# Patient Record
Sex: FEMALE | Race: WHITE
[De-identification: names, ages, dates, MRNs, and addresses within clinical notes are randomized per-mention and may not be internally consistent; named-entity substitution may affect disease eponyms.]

---

## 2018-01-07 ENCOUNTER — HEALTH MAINTENANCE LETTER (OUTPATIENT)
Age: 33
End: 2018-01-07

## 2018-01-08 ENCOUNTER — OFFICE VISIT (OUTPATIENT)
Dept: PEDIATRICS | Facility: CLINIC | Age: 33
End: 2018-01-08
Payer: COMMERCIAL

## 2018-01-08 VITALS
BODY MASS INDEX: 39.03 KG/M2 | TEMPERATURE: 98 F | OXYGEN SATURATION: 99 % | HEART RATE: 94 BPM | HEIGHT: 67 IN | WEIGHT: 248.7 LBS | DIASTOLIC BLOOD PRESSURE: 94 MMHG | SYSTOLIC BLOOD PRESSURE: 142 MMHG

## 2018-01-08 DIAGNOSIS — G47.33 OSA (OBSTRUCTIVE SLEEP APNEA): ICD-10-CM

## 2018-01-08 DIAGNOSIS — I10 BENIGN ESSENTIAL HYPERTENSION: ICD-10-CM

## 2018-01-08 DIAGNOSIS — Z00.00 HEALTH CARE MAINTENANCE: Primary | ICD-10-CM

## 2018-01-08 DIAGNOSIS — Z30.9 ENCOUNTER FOR CONTRACEPTIVE MANAGEMENT, UNSPECIFIED TYPE: ICD-10-CM

## 2018-01-08 DIAGNOSIS — J35.1 ENLARGED TONSILS: ICD-10-CM

## 2018-01-08 PROCEDURE — G0145 SCR C/V CYTO,THINLAYER,RESCR: HCPCS | Performed by: NURSE PRACTITIONER

## 2018-01-08 PROCEDURE — 99214 OFFICE O/P EST MOD 30 MIN: CPT | Mod: 25 | Performed by: NURSE PRACTITIONER

## 2018-01-08 PROCEDURE — 36415 COLL VENOUS BLD VENIPUNCTURE: CPT | Performed by: NURSE PRACTITIONER

## 2018-01-08 PROCEDURE — 80048 BASIC METABOLIC PNL TOTAL CA: CPT | Performed by: NURSE PRACTITIONER

## 2018-01-08 PROCEDURE — 99385 PREV VISIT NEW AGE 18-39: CPT | Performed by: NURSE PRACTITIONER

## 2018-01-08 PROCEDURE — 87624 HPV HI-RISK TYP POOLED RSLT: CPT | Performed by: NURSE PRACTITIONER

## 2018-01-08 PROCEDURE — 82043 UR ALBUMIN QUANTITATIVE: CPT | Performed by: NURSE PRACTITIONER

## 2018-01-08 RX ORDER — HYDROCHLOROTHIAZIDE 25 MG/1
25 TABLET ORAL DAILY
Qty: 30 TABLET | Refills: 1 | Status: SHIPPED | OUTPATIENT
Start: 2018-01-08 | End: 2018-03-08

## 2018-01-08 RX ORDER — NORGESTIMATE AND ETHINYL ESTRADIOL 0.25-0.035
1 KIT ORAL DAILY
Qty: 84 TABLET | Refills: 3 | Status: SHIPPED | OUTPATIENT
Start: 2018-01-08 | End: 2018-12-14

## 2018-01-08 NOTE — MR AVS SNAPSHOT
After Visit Summary   1/8/2018    Radha Rodriguez    MRN: [id number]           Patient Information     Date Of Birth          1985        Visit Information        Provider Department      1/8/2018 4:00 PM Alivia Hercules APRN [facility] [place]        Today's Diagnoses     Health care maintenance    -  1    Encounter for contraceptive management, unspecified type        Enlarged tonsils        THEODORE (obstructive sleep apnea)        Benign essential hypertension          Care Instructions    1. See ENT  2. Labs  3. Labs today. Start BP meds. Re-check labs in 2 weeks.      Preventive Health Recommendations  Female Ages 26 - 39  Yearly exam:   See your health care provider every year in order to    Review health changes.     Discuss preventive care.      Review your medicines if you your doctor has prescribed any.    Until age 30: Get a Pap test every three years (more often if you have had an abnormal result).    After age 30: Talk to your doctor about whether you should have a Pap test every 3 years or have a Pap test with HPV screening every 5 years.   You do not need a Pap test if your uterus was removed (hysterectomy) and you have not had cancer.  You should be tested each year for STDs (sexually transmitted diseases), if you're at risk.   Talk to your provider about how often to have your cholesterol checked.  If you are at risk for diabetes, you should have a diabetes test (fasting glucose).  Shots: Get a flu shot each year. Get a tetanus shot every 10 years.   Nutrition:     Eat at least 5 servings of fruits and vegetables each day.    Eat whole-grain bread, whole-wheat pasta and brown rice instead of white grains and rice.    Talk to your provider about Calcium and Vitamin D.     Lifestyle    Exercise at least 150 minutes a week (30 minutes a day, 5 days of the week). This will help you control your weight and prevent disease.    Limit alcohol to one drink per day.    No smoking.      Wear sunscreen to prevent skin cancer.    See your dentist every six months for an exam and cleaning.            Follow-ups after your visit        Additional Services     OTOLARYNGOLOGY REFERRAL       Your provider has referred you to: [facility]: [facility] [place] [phone]   [email or web address]    Please be aware that coverage of these services is subject to the terms and limitations of your health insurance plan.  Call member services at your health plan with any benefit or coverage questions.      Please bring the following with you to your appointment:    (1) Any X-Rays, CTs or MRIs which have been performed.  Contact the facility where they were done to arrange for  prior to your scheduled appointment.   (2) List of current medications  (3) This referral request   (4) Any documents/labs given to you for this referral                  Future tests that were ordered for you today     Open Future Orders        Priority Expected Expires Ordered    Basic metabolic panel Routine  7/11/2018 1/8/2018    Lipid panel reflex to direct LDL Fasting Routine  1/8/2019 1/8/2018    Glucose Routine  1/8/2019 1/8/2018    TSH with free T4 reflex Routine  1/8/2019 1/8/2018            Who to contact     If you have questions or need follow up information about today's clinic visit or your schedule please contact [facility] directly at [phone].  Normal or non-critical lab and imaging results will be communicated to you by MyChart, letter or phone within 4 business days after the clinic has received the results. If you do not hear from us within 7 days, please contact the clinic through MyChart or phone. If you have a critical or abnormal lab result, we will notify you by phone as soon as possible.  Submit refill requests through [facility] or call your pharmacy and they will forward the refill request to us. Please allow 3 business days for your refill to be completed.  "         Additional Information About Your Visit        MyChart Information     [employer] lets you send messages to your doctor, view your test results, renew your prescriptions, schedule appointments and more. To sign up, go to [email or web address] . Click on \"Log in\" on the left side of the screen, which will take you to the Welcome page. Then click on \"Sign up Now\" on the right side of the page.     You will be asked to enter the access code listed below, as well as some personal information. Please follow the directions to create your username and password.     Your access code is: [email or web address]  Expires: 2018  5:48 PM     Your access code will  in 90 days. If you need help or a new code, please call your [facility] clinic or [phone].        Care EveryWhere ID     This is your Care EveryWhere ID. This could be used by other organizations to access your [facility] medical records  [id number]        Your Vitals Were     Pulse Temperature Height Pulse Oximetry BMI (Body Mass Index)       94 98  F (36.7  C) (Tympanic) 5' 7\" (1.702 m) 99% 38.95 kg/m2        Blood Pressure from Last 3 Encounters:   18 (!) 142/94    Weight from Last 3 Encounters:   18 248 lb 11.2 oz (112.8 kg)              We Performed the Following     Albumin Random Urine Quantitative with Creat Ratio     Basic metabolic panel     HPV High Risk Types DNA Cervical     OTOLARYNGOLOGY REFERRAL     Pap imaged thin layer screen with HPV - recommended age 30 - 65          Today's Medication Changes          These changes are accurate as of: 18  5:48 PM.  If you have any questions, ask your nurse or doctor.               Start taking these medicines.        Dose/Directions    hydrochlorothiazide 25 MG tablet   Commonly known as:  HYDRODIURIL   Used for:  Benign essential hypertension   Started by:  Alivia Hercules APRN CNP        Dose:  25 mg   Take 1 tablet (25 mg) by mouth daily   Quantity:  30 tablet   Refills:  1    "    norgestimate-ethinyl estradiol 0.25-35 MG-MCG per tablet   Commonly known as:  ORTHO-CYCLEN, SPRINTEC   Used for:  Encounter for contraceptive management, unspecified type   Started by:  Alivia Hercules APRN CNP        Dose:  1 tablet   Take 1 tablet by mouth daily   Quantity:  84 tablet   Refills:  3            Where to get your medicines      These medications were sent to [place] [address] [facility] - [place], [place] - [address]  [address], [facility] [address]     Phone:  [phone]     hydrochlorothiazide 25 MG tablet    norgestimate-ethinyl estradiol 0.25-35 MG-MCG per tablet                Primary Care Provider Office Phone # Fax #    MONA Wei [phone] [address] [place] [address]        Equal Access to Services     [facility]: Hadii aad ku hadasho Soomaali, waaxda luqadaha, qaybta kaalmada adeegyada, sylvain contreras . So [facility] [phone].    ATENCIÓN: Si habla español, tiene a george disposición servicios gratuitos de asistencia lingüística. [facility] [phone].    We comply with applicable federal civil rights laws and [place] laws. We do not discriminate on the basis of race, color, national origin, age, disability, sex, sexual orientation, or gender identity.            Thank you!     Thank you for choosing [facility]  for your care. Our goal is always to provide you with excellent care. Hearing back from our patients is one way we can continue to improve our services. Please take a few minutes to complete the written survey that you may receive in the mail after your visit with us. Thank you!             Your Updated Medication List - Protect others around you: Learn how to safely use, store and throw away your medicines at www.disposemymeds.org.          This list is accurate as of: 1/8/18  5:48 PM.  Always use your most recent med list.                   Brand Name Dispense  Instructions for use Diagnosis    hydrochlorothiazide 25 MG tablet    HYDRODIURIL    30 tablet    Take 1 tablet (25 mg) by mouth daily    Benign essential hypertension       MULTIVITAMIN PO           norgestimate-ethinyl estradiol 0.25-35 MG-MCG per tablet    ORTHO-CYCLEN, SPRINTEC    84 tablet    Take 1 tablet by mouth daily    Encounter for contraceptive management, unspecified type

## 2018-01-08 NOTE — PATIENT INSTRUCTIONS
1. See ENT  2. Labs  3. Labs today. Start BP meds. Re-check labs in 2 weeks.      Preventive Health Recommendations  Female Ages 26 - 39  Yearly exam:   See your health care provider every year in order to    Review health changes.     Discuss preventive care.      Review your medicines if you your doctor has prescribed any.    Until age 30: Get a Pap test every three years (more often if you have had an abnormal result).    After age 30: Talk to your doctor about whether you should have a Pap test every 3 years or have a Pap test with HPV screening every 5 years.   You do not need a Pap test if your uterus was removed (hysterectomy) and you have not had cancer.  You should be tested each year for STDs (sexually transmitted diseases), if you're at risk.   Talk to your provider about how often to have your cholesterol checked.  If you are at risk for diabetes, you should have a diabetes test (fasting glucose).  Shots: Get a flu shot each year. Get a tetanus shot every 10 years.   Nutrition:     Eat at least 5 servings of fruits and vegetables each day.    Eat whole-grain bread, whole-wheat pasta and brown rice instead of white grains and rice.    Talk to your provider about Calcium and Vitamin D.     Lifestyle    Exercise at least 150 minutes a week (30 minutes a day, 5 days of the week). This will help you control your weight and prevent disease.    Limit alcohol to one drink per day.    No smoking.     Wear sunscreen to prevent skin cancer.    See your dentist every six months for an exam and cleaning.

## 2018-01-08 NOTE — PROGRESS NOTES
SUBJECTIVE:   CC: Radha Rodriguez is an 32 year old woman who presents for preventive health visit.     Physical   Annual:     Getting at least 3 servings of Calcium per day::  Yes    Bi-annual eye exam::  Yes    Dental care twice a year::  Yes    Sleep apnea or symptoms of sleep apnea::  Sleep apnea    Diet::  Regular (no restrictions)    Frequency of exercise::  2-3 days/week    Duration of exercise::  30-45 minutes    Taking medications regularly::  Yes    Medication side effects::  Not applicable    Additional concerns today::  YES          Concerns today: contraception for PCOS symptoms. Was diagnosed with PCOS at age 19. Gets very irregular periods. Lately has been constant spotting.    Previous history of high BP - not currently on meds. Back when she was on meds BP was in the 180s. Has changed her diet since then.    -------------------------------------    Today's PHQ-2 Score:   PHQ-2 ( 1999 Pfizer) 1/8/2018   Q1: Little interest or pleasure in doing things 0   Q2: Feeling down, depressed or hopeless 0   PHQ-2 Score 0   Q1: Little interest or pleasure in doing things Not at all   Q2: Feeling down, depressed or hopeless Not at all   PHQ-2 Score 0     Abuse: Current or Past(Physical, Sexual or Emotional)- No  Do you feel safe in your environment - Yes    Social History   Substance Use Topics     Smoking status: Never Smoker     Smokeless tobacco: Never Used     Alcohol use Yes      Comment: 1 per month     Alcohol Use 1/8/2018   If you drink alcohol, do you typically have greater than 3 drinks per day OR greater than 7 drinks per week?   No   No flowsheet data found.      Reviewed orders with patient.  Reviewed health maintenance and updated orders accordingly - Yes  Labs reviewed in EPIC    Mammogram not appropriate for this patient based on age.    Pertinent mammograms are reviewed under the imaging tab.  History of abnormal Pap smear: NO - age 30-65 PAP every 5 years with negative HPV co-testing  "recommended    Reviewed and updated as needed this visit by clinical staffTobacco  Allergies  Meds  Med Hx  Surg Hx  Fam Hx  Soc Hx        Reviewed and updated as needed this visit by Provider            Review of Systems  C: NEGATIVE for fever, chills, change in weight  I: NEGATIVE for worrisome rashes, moles or lesions  E: NEGATIVE for vision changes or irritation  ENT: NEGATIVE for ear, mouth and throat problems  R: NEGATIVE for significant cough or SOB  B: NEGATIVE for masses, tenderness or discharge  CV: NEGATIVE for chest pain, palpitations or peripheral edema  GI: NEGATIVE for nausea, abdominal pain, heartburn, or change in bowel habits  : NEGATIVE for unusual urinary or vaginal symptoms. Periods are regular.  M: NEGATIVE for significant arthralgias or myalgia  N: NEGATIVE for weakness, dizziness or paresthesias  P: NEGATIVE for changes in mood or affect  DERM: Hirsutism.      OBJECTIVE:   BP (!) 142/94 (BP Location: Right arm, Patient Position: Sitting, Cuff Size: Adult Large)  Pulse 94  Temp 98  F (36.7  C) (Tympanic)  Ht 5' 7\" (1.702 m)  Wt 248 lb 11.2 oz (112.8 kg)  LMP   SpO2 99%  BMI 38.95 kg/m2  Physical Exam  GENERAL: healthy, alert and no distress  EYES: Eyes grossly normal to inspection, PERRL and conjunctivae and sclerae normal  HENT: ear canals and TM's normal, nose and mouth without ulcers or lesions  NECK: no adenopathy, no asymmetry, masses, or scars and thyroid normal to palpation  RESP: lungs clear to auscultation - no rales, rhonchi or wheezes  BREAST: normal without masses, tenderness or nipple discharge and no palpable axillary masses or adenopathy  CV: regular rate and rhythm, normal S1 S2, no S3 or S4, no murmur, click or rub, no peripheral edema and peripheral pulses strong  ABDOMEN: soft, nontender, no hepatosplenomegaly, no masses and bowel sounds normal   (female): normal female external genitalia, normal urethral meatus, vaginal mucosa pink, moist, well rugated, and " "normal cervix/adnexa/uterus without masses or discharge  MS: no gross musculoskeletal defects noted, no edema  SKIN: no suspicious lesions or rashes  NEURO: Normal strength and tone, mentation intact and speech normal  PSYCH: mentation appears normal, affect normal/bright    ASSESSMENT/PLAN:   1. Health care maintenance  - Pap imaged thin layer screen with HPV - recommended age 30 - 65  - HPV High Risk Types DNA Cervical  - Lipid panel reflex to direct LDL Fasting; Future  - Glucose; Future  - TSH with free T4 reflex; Future    2. Encounter for contraceptive management, unspecified type  Patient with PCOS. Previously on OCPs. No migraine with aura or history of blood clots. She does have a history of hypertension and is starting medication today. Will have her monitor her BP and wait a month before starting medications.   - norgestimate-ethinyl estradiol (ORTHO-CYCLEN, SPRINTEC) 0.25-35 MG-MCG per tablet; Take 1 tablet by mouth daily  Dispense: 84 tablet; Refill: 3    3. Enlarged tonsils  History of enlarged tonsils. Has sleep apnea.   - OTOLARYNGOLOGY REFERRAL    4. THEODORE (obstructive sleep apnea)  Wants to see ENT to see if removing her tonsils can improve sleep apnea sx.   - OTOLARYNGOLOGY REFERRAL    5. Benign essential hypertension  Ongoing, previously much higher. Hasn't been on meds for quite some time and is asymptomatic.   -Labs today. Re-start HCTZ then BP check/labs 2 weeks.   - hydrochlorothiazide (HYDRODIURIL) 25 MG tablet; Take 1 tablet (25 mg) by mouth daily  Dispense: 30 tablet; Refill: 1  - Albumin Random Urine Quantitative with Creat Ratio  - Basic metabolic panel; Future  - Basic metabolic panel    COUNSELING:  Reviewed preventive health counseling, as reflected in patient instructions         reports that she has never smoked. She has never used smokeless tobacco.    Estimated body mass index is 38.95 kg/(m^2) as calculated from the following:    Height as of this encounter: 5' 7\" (1.702 m).    " Weight as of this encounter: 248 lb 11.2 oz (112.8 kg).   Weight management plan: Discussed healthy diet and exercise guidelines and patient will follow up in 12 months in clinic to re-evaluate.    Counseling Resources:  ATP IV Guidelines  Pooled Cohorts Equation Calculator  Breast Cancer Risk Calculator  FRAX Risk Assessment  ICSI Preventive Guidelines  Dietary Guidelines for Americans, 2010  USDA's MyPlate  ASA Prophylaxis  Lung CA Screening    MONA Wei [facility] [place]

## 2018-01-08 NOTE — LETTER
[address] [address]                  [phone]   January 10, 2018    Radha Rodriguez  [address]   [facility] [address]      Yahir Garcia,    Your blood tests look normal (kidneys, blood sugar). However, your urine shows some protein in the urine, which is an early sign of kidney damage, typically from high blood pressure. This is typically reversible if you control your blood pressure. We can re-check next year. But I will await your blood pressure recheck in 2 weeks. Keep up the good work with exercise, as this will help your blood pressure as well.    All the best,    Alivia Hercules, FNP-DNP.      Results for orders placed or performed in visit on 01/08/18   Albumin Random Urine Quantitative with Creat Ratio   Result Value Ref Range    Creatinine Urine 48 mg/dL    Albumin Urine mg/L 13 mg/L    Albumin Urine mg/g Cr 26.45 (H) 0 - 25 mg/g Cr   Basic metabolic panel   Result Value Ref Range    Sodium 140 133 - 144 mmol/L    Potassium 3.4 3.4 - 5.3 mmol/L    Chloride 106 94 - 109 mmol/L    Carbon Dioxide 22 20 - 32 mmol/L    Anion Gap 12 3 - 14 mmol/L    Glucose 77 70 - 99 mg/dL    Urea Nitrogen 15 7 - 30 mg/dL    Creatinine 0.74 0.52 - 1.04 mg/dL    GFR Estimate >90 >60 mL/min/1.7m2    GFR Estimate If Black >90 >60 mL/min/1.7m2    Calcium 9.3 8.5 - 10.1 mg/dL

## 2018-01-08 NOTE — NURSING NOTE
"Chief Complaint   Patient presents with     Physical       Initial BP (!) 142/94 (BP Location: Right arm, Patient Position: Sitting, Cuff Size: Adult Large)  Pulse 94  Temp 98  F (36.7  C) (Tympanic)  Ht 5' 7\" (1.702 m)  Wt 248 lb 11.2 oz (112.8 kg)  LMP   SpO2 99%  BMI 38.95 kg/m2 Estimated body mass index is 38.95 kg/(m^2) as calculated from the following:    Height as of this encounter: 5' 7\" (1.702 m).    Weight as of this encounter: 248 lb 11.2 oz (112.8 kg).  Medication Reconciliation: complete   Taylor Wood CMA    "

## 2018-01-09 LAB
ANION GAP SERPL CALCULATED.3IONS-SCNC: 12 MMOL/L (ref 3–14)
BUN SERPL-MCNC: 15 MG/DL (ref 7–30)
CALCIUM SERPL-MCNC: 9.3 MG/DL (ref 8.5–10.1)
CHLORIDE SERPL-SCNC: 106 MMOL/L (ref 94–109)
CO2 SERPL-SCNC: 22 MMOL/L (ref 20–32)
CREAT SERPL-MCNC: 0.74 MG/DL (ref 0.52–1.04)
CREAT UR-MCNC: 48 MG/DL
GFR SERPL CREATININE-BSD FRML MDRD: >90 ML/MIN/1.7M2
GLUCOSE SERPL-MCNC: 77 MG/DL (ref 70–99)
MICROALBUMIN UR-MCNC: 13 MG/L
MICROALBUMIN/CREAT UR: 26.45 MG/G CR (ref 0–25)
POTASSIUM SERPL-SCNC: 3.4 MMOL/L (ref 3.4–5.3)
SODIUM SERPL-SCNC: 140 MMOL/L (ref 133–144)

## 2018-01-11 DIAGNOSIS — I10 BENIGN ESSENTIAL HYPERTENSION: ICD-10-CM

## 2018-01-11 DIAGNOSIS — Z00.00 HEALTH CARE MAINTENANCE: ICD-10-CM

## 2018-01-11 LAB
COPATH REPORT: NORMAL
PAP: NORMAL

## 2018-01-11 PROCEDURE — 80061 LIPID PANEL: CPT | Performed by: FAMILY MEDICINE

## 2018-01-11 PROCEDURE — 80048 BASIC METABOLIC PNL TOTAL CA: CPT | Performed by: FAMILY MEDICINE

## 2018-01-11 PROCEDURE — 84443 ASSAY THYROID STIM HORMONE: CPT | Performed by: FAMILY MEDICINE

## 2018-01-11 PROCEDURE — 36415 COLL VENOUS BLD VENIPUNCTURE: CPT | Performed by: FAMILY MEDICINE

## 2018-01-12 LAB
ANION GAP SERPL CALCULATED.3IONS-SCNC: 7 MMOL/L (ref 3–14)
BUN SERPL-MCNC: 14 MG/DL (ref 7–30)
CALCIUM SERPL-MCNC: 9.3 MG/DL (ref 8.5–10.1)
CHLORIDE SERPL-SCNC: 105 MMOL/L (ref 94–109)
CHOLEST SERPL-MCNC: 212 MG/DL
CO2 SERPL-SCNC: 26 MMOL/L (ref 20–32)
CREAT SERPL-MCNC: 0.75 MG/DL (ref 0.52–1.04)
GFR SERPL CREATININE-BSD FRML MDRD: >90 ML/MIN/1.7M2
GLUCOSE SERPL-MCNC: 97 MG/DL (ref 70–99)
HDLC SERPL-MCNC: 42 MG/DL
LDLC SERPL CALC-MCNC: 146 MG/DL
NONHDLC SERPL-MCNC: 170 MG/DL
POTASSIUM SERPL-SCNC: 4.3 MMOL/L (ref 3.4–5.3)
SODIUM SERPL-SCNC: 138 MMOL/L (ref 133–144)
TRIGL SERPL-MCNC: 119 MG/DL
TSH SERPL DL<=0.005 MIU/L-ACNC: 1.11 MU/L (ref 0.4–4)

## 2018-01-15 LAB
FINAL DIAGNOSIS: NORMAL
HPV HR 12 DNA CVX QL NAA+PROBE: NEGATIVE
HPV16 DNA SPEC QL NAA+PROBE: NEGATIVE
HPV18 DNA SPEC QL NAA+PROBE: NEGATIVE
SPECIMEN DESCRIPTION: NORMAL

## 2018-01-18 PROBLEM — I10 BENIGN ESSENTIAL HYPERTENSION: Status: ACTIVE | Noted: 2018-01-18

## 2018-01-18 PROBLEM — I10 BENIGN ESSENTIAL HYPERTENSION: Status: ACTIVE | Noted: 2018-01-08

## 2018-01-24 ENCOUNTER — ALLIED HEALTH/NURSE VISIT (OUTPATIENT)
Dept: NURSING | Facility: CLINIC | Age: 33
End: 2018-01-24
Payer: COMMERCIAL

## 2018-01-24 VITALS — DIASTOLIC BLOOD PRESSURE: 86 MMHG | HEART RATE: 72 BPM | SYSTOLIC BLOOD PRESSURE: 150 MMHG

## 2018-01-24 DIAGNOSIS — Z01.30 BP CHECK: Primary | ICD-10-CM

## 2018-01-24 PROCEDURE — 99207 ZZC NO CHARGE NURSE ONLY: CPT

## 2018-01-24 NOTE — MR AVS SNAPSHOT
"              After Visit Summary   2018    Radha Rodriguez    MRN: [id number]           Patient Information     Date Of Birth          1985        Visit Information        Provider Department      2018 9:30 AM [facility] [place]        Today's Diagnoses     BP check    -  1       Follow-ups after your visit        Who to contact     If you have questions or need follow up information about today's clinic visit or your schedule please contact [facility] directly at [phone].  Normal or non-critical lab and imaging results will be communicated to you by MyChart, letter or phone within 4 business days after the clinic has received the results. If you do not hear from us within 7 days, please contact the clinic through MyChart or phone. If you have a critical or abnormal lab result, we will notify you by phone as soon as possible.  Submit refill requests through [employer] or call your pharmacy and they will forward the refill request to us. Please allow 3 business days for your refill to be completed.          Additional Information About Your Visit        MyChart Information     [employer] lets you send messages to your doctor, view your test results, renew your prescriptions, schedule appointments and more. To sign up, go to [email or web address] . Click on \"Log in\" on the left side of the screen, which will take you to the Welcome page. Then click on \"Sign up Now\" on the right side of the page.     You will be asked to enter the access code listed below, as well as some personal information. Please follow the directions to create your username and password.     Your access code is: [email or web address]  Expires: 2018  5:48 PM     Your access code will  in 90 days. If you need help or a new code, please call your [facility] or [phone].        Care EveryWhere ID     This is your Care EveryWhere ID. This could be used by other organizations to access " your [facility] medical records  [id number]        Your Vitals Were     Pulse                   72            Blood Pressure from Last 3 Encounters:   01/24/18 150/86   01/08/18 (!) 142/94    Weight from Last 3 Encounters:   01/08/18 248 lb 11.2 oz (112.8 kg)              Today, you had the following     No orders found for display       Primary Care Provider Office Phone # Fax #    Alivia Hercules, MONA [facility] [phone] [address] [place] [address]        Equal Access to Services     [facility]: Hadii aad ku hadasho Soomaali, waaxda luqadaha, qaybta kaalmada adeegyada, waxay idiin hayaan lester contreras . So [facility] [phone].    ATENCIÓN: Si habla español, tiene a george disposición servicios gratuitos de asistencia lingüística. Llame al [phone].    We comply with applicable federal civil rights laws and [place] laws. We do not discriminate on the basis of race, color, national origin, age, disability, sex, sexual orientation, or gender identity.            Thank you!     Thank you for choosing [facility] [place]  for your care. Our goal is always to provide you with excellent care. Hearing back from our patients is one way we can continue to improve our services. Please take a few minutes to complete the written survey that you may receive in the mail after your visit with us. Thank you!             Your Updated Medication List - Protect others around you: Learn how to safely use, store and throw away your medicines at www.disposemymeds.org.          This list is accurate as of 1/24/18  9:51 AM.  Always use your most recent med list.                   Brand Name Dispense Instructions for use Diagnosis    hydrochlorothiazide 25 MG tablet    HYDRODIURIL    30 tablet    Take 1 tablet (25 mg) by mouth daily    Benign essential hypertension       MULTIVITAMIN PO           norgestimate-ethinyl estradiol 0.25-35 MG-MCG per tablet    ORTHO-CYCLEN, SPRINTEC    84  tablet    Take 1 tablet by mouth daily    Encounter for contraceptive management, unspecified type

## 2018-01-24 NOTE — PROGRESS NOTES
Radha Rodriguez is being followed for Blood Pressure management.    NURSING ASSESSMENT/PLAN:  Blood pressure reading today is not at the provider specified goal of <130/80.    Current blood pressure medication(s):  Hydrodiuril 25 mg daily.   1.  The patient will;route to provider to determine plan.     2.  We will not be checking a metabolic lab panel today.      3.  Follow up instructions include:     Per provider recommendation.    SUBJECTIVE:                                                    The patient is taking medication as prescribed and is tolerating well.   Patient is not monitoring Blood Pressure at home.     Out of the following complicating factors: Cough, Headache, Lightheadedness, Shortness of breath, Fatigue, Nausea, Sexual Dysfunction, New onset of swelling or edema, Weakness and New onset of Chest Pain, the patient reports:  None    OBJECTIVE:                                                    Is pulse 55 or greater? - Yes  Vitals:    01/24/18 0930 01/24/18 0942   BP: (!) 160/94 150/86   BP Location: Left arm    Patient Position: Chair    Cuff Size: Adult Large    Pulse: 72      /86  Pulse 72  Pulse Readings from Last 1 Encounters:   01/08/18 94     Today's BP completed using cuff size: large on left side  arm.  BP Readings from Last 3 Encounters:   01/08/18 (!) 142/94       Current Outpatient Prescriptions   Medication Sig Dispense Refill     Multiple Vitamins-Minerals (MULTIVITAMIN PO)        norgestimate-ethinyl estradiol (ORTHO-CYCLEN, SPRINTEC) 0.25-35 MG-MCG per tablet Take 1 tablet by mouth daily 84 tablet 3     hydrochlorothiazide (HYDRODIURIL) 25 MG tablet Take 1 tablet (25 mg) by mouth daily 30 tablet 1     Potassium   Date Value Ref Range Status   01/11/2018 4.3 3.4 - 5.3 mmol/L Final     Creatinine   Date Value Ref Range Status   01/11/2018 0.75 0.52 - 1.04 mg/dL Final     Urea Nitrogen   Date Value Ref Range Status   01/11/2018 14 7 - 30 mg/dL Final     GFR Estimate   Date Value  Ref Range Status   01/11/2018 >90 >60 mL/min/1.7m2 Final     Comment:     Non  GFR Calc      A baseline potassium, creatinine, BUN, GFR has been done within past 12 months    Education:  general discussion/verbal explanation  Limit dietary sodium intake between 6828-5099 mg every day  Regular aerobic exercise.  Discussed habit change regarding disease management/lifestyle changes limiting sodium, diet, exercise lifestyle management,  These interventions were used: Empathy/Understanding, Collaboration, Evocation, Support autonomy, Permission to raise concern or advise, Open ended questions and Roll with resistance  Education material provided and patient was given an opportunity to ask questions.    Patient verbalized understanding of this plan and is agreeable.    Professional Reference click here   Copy of current RN HTN MGMT order or refer to Other Orders:    Dosage adjustment made based on patient specific, physician directed, care plan.  Alivia Hercules CNP please review vitals, please note second blood pressure lower of 150/86. Initial 160/94, pulse of 74. Please advise Radha of plan.   Yuli Perez RN  [facility]

## 2018-01-25 ENCOUNTER — TELEPHONE (OUTPATIENT)
Dept: PEDIATRICS | Facility: CLINIC | Age: 33
End: 2018-01-25

## 2018-01-25 NOTE — TELEPHONE ENCOUNTER
BP recheck high after starting meds. Please have patient purchase a BP cuff and check at home. Please then have her call in 1 week with results.

## 2018-01-25 NOTE — TELEPHONE ENCOUNTER
Patient calls, advised and in agreement.  Will call with her blood pressure log in about a week.  Radha Shoemaker RN  Message handled by Nurse Triage.

## 2018-03-08 DIAGNOSIS — I10 BENIGN ESSENTIAL HYPERTENSION: ICD-10-CM

## 2018-03-08 NOTE — TELEPHONE ENCOUNTER
"Requested Prescriptions   Pending Prescriptions Disp Refills     hydrochlorothiazide (HYDRODIURIL) 25 MG tablet    Last Written Prescription Date:  1/8/2018  Last Fill Quantity: 30,  # refills: 1   Last office visit: 1/8/2018 with prescribing provider:  Alivia Hercules     Future Office Visit:     30 tablet 1     Sig: Take 1 tablet (25 mg) by mouth daily    Diuretics (Including Combos) Protocol Failed    3/8/2018  8:28 AM       Failed - Blood pressure under 140/90 in past 12 months    BP Readings from Last 3 Encounters:   01/24/18 150/86   01/08/18 (!) 142/94                Passed - Recent (12 mo) or future (30 days) visit within the authorizing provider's specialty    Patient had office visit in the last year or has a visit in the next 30 days with authorizing provider.  See \"Patient Info\" tab in inbasket, or \"Choose Columns\" in Meds & Orders section of the refill encounter.            Passed - Patient is age 18 or older       Passed - No active pregancy on record       Passed - Normal serum creatinine on file in past 12 months    Recent Labs   Lab Test  01/11/18   0829   CR  0.75             Passed - Normal serum potassium on file in past 12 months    Recent Labs   Lab Test  01/11/18   0829   POTASSIUM  4.3                   Passed - Normal serum sodium on file in past 12 months    Recent Labs   Lab Test  01/11/18   0829   NA  138             Passed - No positive pregnancy test in past 12 months          "

## 2018-03-09 RX ORDER — HYDROCHLOROTHIAZIDE 25 MG/1
25 TABLET ORAL DAILY
Qty: 30 TABLET | Refills: 0 | Status: SHIPPED | OUTPATIENT
Start: 2018-03-09 | End: 2018-03-26

## 2018-03-09 NOTE — TELEPHONE ENCOUNTER
Routing refill request to provider for review/approval because:  BP elevated. Please advise.   [employer], RN  Triage Nurse

## 2018-03-19 NOTE — TELEPHONE ENCOUNTER
Called and left VM for patient to contact clinic.  Patient needs BP check appt and labs.  Taylor Wood, CMA

## 2018-03-26 ENCOUNTER — OFFICE VISIT (OUTPATIENT)
Dept: PEDIATRICS | Facility: CLINIC | Age: 33
End: 2018-03-26
Payer: COMMERCIAL

## 2018-03-26 VITALS
WEIGHT: 248 LBS | HEART RATE: 94 BPM | SYSTOLIC BLOOD PRESSURE: 144 MMHG | OXYGEN SATURATION: 100 % | TEMPERATURE: 98.9 F | BODY MASS INDEX: 38.84 KG/M2 | DIASTOLIC BLOOD PRESSURE: 82 MMHG

## 2018-03-26 DIAGNOSIS — I10 BENIGN ESSENTIAL HYPERTENSION: Primary | ICD-10-CM

## 2018-03-26 DIAGNOSIS — B96.89 BACTERIAL VAGINOSIS: ICD-10-CM

## 2018-03-26 DIAGNOSIS — N89.8 VAGINAL ODOR: ICD-10-CM

## 2018-03-26 DIAGNOSIS — N76.0 BACTERIAL VAGINOSIS: ICD-10-CM

## 2018-03-26 PROBLEM — E66.01 MORBID OBESITY (H): Status: ACTIVE | Noted: 2018-03-26

## 2018-03-26 LAB
SPECIMEN SOURCE: ABNORMAL
WET PREP SPEC: ABNORMAL

## 2018-03-26 PROCEDURE — 87210 SMEAR WET MOUNT SALINE/INK: CPT | Performed by: NURSE PRACTITIONER

## 2018-03-26 PROCEDURE — 99214 OFFICE O/P EST MOD 30 MIN: CPT | Performed by: NURSE PRACTITIONER

## 2018-03-26 RX ORDER — METRONIDAZOLE 500 MG/1
500 TABLET ORAL 2 TIMES DAILY
Qty: 14 TABLET | Refills: 0 | Status: SHIPPED | OUTPATIENT
Start: 2018-03-26 | End: 2018-11-04

## 2018-03-26 RX ORDER — LISINOPRIL 10 MG/1
10 TABLET ORAL DAILY
Qty: 90 TABLET | Refills: 1 | Status: SHIPPED | OUTPATIENT
Start: 2018-03-26 | End: 2018-04-19

## 2018-03-26 NOTE — PROGRESS NOTES
SUBJECTIVE:   Radha Rodriguez is a 32 year old female who presents to clinic today for the following health issues:      Hypertension Follow-up      Outpatient blood pressures are being checked at home.  Results are 150/90.    Low Salt Diet: no added salt      Amount of exercise or physical activity: 2-3 days/week for an average of 45-60 minutes    Problems taking medications regularly: No    Medication side effects: none    Diet: regular (no restrictions)        SUBJECTIVE:   Radha Rodriguez is a 32 year old female who presents to clinic today for the following health issues:      Vaginal Symptoms      Duration: 3 weeks    Description  odor    Intensity:  mild    Accompanying signs and symptoms (fever/dysuria/abdominal or back pain): None    History  Sexually active: not at present  Possibility of pregnancy: No  Recent antibiotic use: no     Precipitating or alleviating factors: None    Therapies tried and outcome: none   Outcome:     ROS: const/cv/resp/gi/gu/gyn otherwise negative     OBJECTIVE:  /82 (Cuff Size: Adult Large)  Pulse 94  Temp 98.9  F (37.2  C) (Oral)  Wt 248 lb (112.5 kg)  SpO2 100%  BMI 38.84 kg/m2  CONSTITUTIONAL: Alert, well-nourished, well-groomed, NAD  RESP: Lungs CTA. No wheeze, rhonchi, rales.  CV: HRRR S1 S2 No MRG. No peripheral edema      ASSESSMENT/PLAN:  (I10) Benign essential hypertension  (primary encounter diagnosis)  Comment: It sounds like the HCTZ hasn't had any effect on her BP. Has been monitoring from home. Still asymptomatic.   Plan: **Basic metabolic panel FUTURE 1yr, lisinopril         (PRINIVIL/ZESTRIL) 10 MG tablet        Stop HCTZ. Start lisinopril. She will mychart me 1 week with her updated BPs. Labs and BP check 2 weeks.   -Discussed lifestyle changes. Patient interested in mediterranean diet.     (N89.8) Vaginal odor  Comment: + BV  Plan: Wet prep        Discussed supportive cares and reasons to return.   Metronidazole x 1 week. Discussed r/b/se.    (N76.0,   B96.89) Bacterial vaginosis  Plan: metroNIDAZOLE (FLAGYL) 500 MG tablet                EDY Olvera.

## 2018-03-26 NOTE — PATIENT INSTRUCTIONS
-STOP HCTZ and START lisinopril and re-check labs in 2 weeks.  -Message me with blood pressures in a week  -Antibiotics twice a day for 1 week

## 2018-03-26 NOTE — MR AVS SNAPSHOT
"              After Visit Summary   3/26/2018    Radha Rodriguez    MRN: [id number]           Patient Information     Date Of Birth          1985        Visit Information        Provider Department      3/26/2018 8:40 AM Alivia Hercules APRN [facility]        Today's Diagnoses     Benign essential hypertension    -  1    Vaginal odor        Bacterial vaginosis          Care Instructions    -STOP HCTZ and START lisinopril and re-check labs in 2 weeks.  -Message me with blood pressures in a week  -Antibiotics twice a day for 1 week          Follow-ups after your visit        Follow-up notes from your care team     Return in about 2 weeks (around 4/9/2018) for BP check (pharmcy) and lab visit (schedule appt).      Who to contact     If you have questions or need follow up information about today's clinic visit or your schedule please [facility] at [phone].  Normal or non-critical lab and imaging results will be communicated to you by MyChart, letter or phone within 4 business days after the clinic has received the results. If you do not hear from us within 7 days, please contact the clinic through [employer] or phone. If you have a critical or abnormal lab result, we will notify you by phone as soon as possible.  Submit refill requests through [facility] or call your pharmacy and they will forward the refill request to us. Please allow 3 business days for your refill to be completed.          Additional Information About Your Visit        MyChart Information     [facility] lets you send messages to your doctor, view your test results, renew your prescriptions, schedule appointments and more. To sign up, go to [email or web address] . Click on \"Log in\" on the left side of the screen, which will take you to the Welcome page. Then click on \"Sign up Now\" on the right side of the page.     You will be asked to enter the access code listed below, as well as some personal " information. Please follow the directions to create your username and password.     Your access code is: [email or web address]  Expires: 2018  6:48 PM     Your access code will  in 90 days. If you need help or a new code, please call your [place] clinic or [phone].        Care EveryWhere ID     This is your Care EveryWhere ID. This could be used by other organizations to access your [place] medical records  [id number]        Your Vitals Were     Pulse Temperature Pulse Oximetry BMI (Body Mass Index)          94 98.9  F (37.2  C) (Oral) 100% 38.84 kg/m2         Blood Pressure from Last 3 Encounters:   18 144/82   18 150/86   18 (!) 142/94    Weight from Last 3 Encounters:   18 248 lb (112.5 kg)   18 248 lb 11.2 oz (112.8 kg)              We Performed the Following     **Basic metabolic panel FUTURE 1yr     Wet prep          Today's Medication Changes          These changes are accurate as of 3/26/18  9:06 AM.  If you have any questions, ask your nurse or doctor.               Start taking these medicines.        Dose/Directions    lisinopril 10 MG tablet   Commonly known as:  PRINIVIL/ZESTRIL   Used for:  Benign essential hypertension        Dose:  10 mg   Take 1 tablet (10 mg) by mouth daily   Quantity:  90 tablet   Refills:  1       metroNIDAZOLE 500 MG tablet   Commonly known as:  FLAGYL   Used for:  Bacterial vaginosis        Dose:  500 mg   Take 1 tablet (500 mg) by mouth 2 times daily   Quantity:  14 tablet   Refills:  0         Stop taking these medicines if you haven't already. Please contact your care team if you have questions.     hydrochlorothiazide 25 MG tablet   Commonly known as:  HYDRODIURIL                Where to get your medicines      These medications were sent to [facility] [id number] [facility] [place] - [address]  [address], [place] [address]     Phone:  [phone]     lisinopril 10 MG tablet    metroNIDAZOLE 500 MG tablet                 Primary Care Provider Office Phone # Fax #    Alivia Hercules, MONA ANTONIO [phone] [address] [place] [address]        Equal Access to Services     ASHTYN TREVINO : Hadii aad ku hadshweta Trinidad, waaxda luqadaha, qaybta kaalmada camron, sylvain batista marquisejane krishna ben mcguire. So [facility] [phone].    ATENCIÓN: Si habla español, tiene a george disposición servicios gratuitos de asistencia lingüística. Llame al [phone].    We comply with applicable federal civil rights laws and [place] laws. We do not discriminate on the basis of race, color, national origin, age, disability, sex, sexual orientation, or gender identity.            Thank you!     Thank you for choosing [facility]  for your care. Our goal is always to provide you with excellent care. Hearing back from our patients is one way we can continue to improve our services. Please take a few minutes to complete the written survey that you may receive in the mail after your visit with us. Thank you!             Your Updated Medication List - Protect others around you: Learn how to safely use, store and throw away your medicines at www.disposemymeds.org.          This list is accurate as of 3/26/18  9:06 AM.  Always use your most recent med list.                   Brand Name Dispense Instructions for use Diagnosis    lisinopril 10 MG tablet    PRINIVIL/ZESTRIL    90 tablet    Take 1 tablet (10 mg) by mouth daily    Benign essential hypertension       metroNIDAZOLE 500 MG tablet    FLAGYL    14 tablet    Take 1 tablet (500 mg) by mouth 2 times daily    Bacterial vaginosis       MULTIVITAMIN PO           norgestimate-ethinyl estradiol 0.25-35 MG-MCG per tablet    ORTHO-CYCLEN, SPRINTEC    84 tablet    Take 1 tablet by mouth daily    Encounter for contraceptive management, unspecified type

## 2018-04-11 ENCOUNTER — TELEPHONE (OUTPATIENT)
Dept: PEDIATRICS | Facility: CLINIC | Age: 33
End: 2018-04-11

## 2018-04-11 NOTE — TELEPHONE ENCOUNTER
Pharmacy left message  for refill of HCTZ but not on med list. Per last office visit note this was discontinued. Contacted pharmacist.  Suad Klein RN

## 2018-04-18 ENCOUNTER — MYC MEDICAL ADVICE (OUTPATIENT)
Dept: PEDIATRICS | Facility: CLINIC | Age: 33
End: 2018-04-18

## 2018-04-18 DIAGNOSIS — I10 BENIGN ESSENTIAL HYPERTENSION: ICD-10-CM

## 2018-04-18 RX ORDER — LISINOPRIL 10 MG/1
10 TABLET ORAL DAILY
Qty: 90 TABLET | Refills: 1 | Status: CANCELLED | OUTPATIENT
Start: 2018-04-18

## 2018-04-19 RX ORDER — LISINOPRIL 20 MG/1
20 TABLET ORAL DAILY
Qty: 30 TABLET | Refills: 0 | Status: SHIPPED | OUTPATIENT
Start: 2018-04-19 | End: 2018-05-24

## 2018-04-30 DIAGNOSIS — I10 BENIGN ESSENTIAL HYPERTENSION: ICD-10-CM

## 2018-04-30 PROCEDURE — 36415 COLL VENOUS BLD VENIPUNCTURE: CPT | Performed by: FAMILY MEDICINE

## 2018-04-30 PROCEDURE — 80048 BASIC METABOLIC PNL TOTAL CA: CPT | Performed by: FAMILY MEDICINE

## 2018-05-01 LAB
ANION GAP SERPL CALCULATED.3IONS-SCNC: 7 MMOL/L (ref 3–14)
BUN SERPL-MCNC: 16 MG/DL (ref 7–30)
CALCIUM SERPL-MCNC: 9.3 MG/DL (ref 8.5–10.1)
CHLORIDE SERPL-SCNC: 107 MMOL/L (ref 94–109)
CO2 SERPL-SCNC: 26 MMOL/L (ref 20–32)
CREAT SERPL-MCNC: 0.73 MG/DL (ref 0.52–1.04)
GFR SERPL CREATININE-BSD FRML MDRD: >90 ML/MIN/1.7M2
GLUCOSE SERPL-MCNC: 90 MG/DL (ref 70–99)
POTASSIUM SERPL-SCNC: 4 MMOL/L (ref 3.4–5.3)
SODIUM SERPL-SCNC: 140 MMOL/L (ref 133–144)

## 2018-05-24 ENCOUNTER — MYC REFILL (OUTPATIENT)
Dept: PEDIATRICS | Facility: CLINIC | Age: 33
End: 2018-05-24

## 2018-05-24 DIAGNOSIS — I10 BENIGN ESSENTIAL HYPERTENSION: ICD-10-CM

## 2018-05-25 ENCOUNTER — ALLIED HEALTH/NURSE VISIT (OUTPATIENT)
Dept: NURSING | Facility: CLINIC | Age: 33
End: 2018-05-25
Payer: COMMERCIAL

## 2018-05-25 VITALS — DIASTOLIC BLOOD PRESSURE: 68 MMHG | SYSTOLIC BLOOD PRESSURE: 124 MMHG | HEART RATE: 78 BPM

## 2018-05-25 DIAGNOSIS — Z01.30 BP CHECK: Primary | ICD-10-CM

## 2018-05-25 PROCEDURE — 99207 ZZC DROP WITH A PROCEDURE: CPT | Mod: 25

## 2018-05-25 RX ORDER — LISINOPRIL 20 MG/1
20 TABLET ORAL DAILY
Qty: 30 TABLET | Refills: 0 | Status: SHIPPED | OUTPATIENT
Start: 2018-05-25 | End: 2018-05-28

## 2018-05-25 NOTE — PROGRESS NOTES
Radha Rodriguez is being followed for Blood Pressure management.    NURSING ASSESSMENT/PLAN:  Blood pressure reading today is at the provider specified goal of <130/80.    Current blood pressure medication(s):  Lisinopril 20 mg daily  1.  The patient will continue current medication regimen unchanged.   Unless advised by PCP otherwise  2.  We will not be checking a metabolic lab panel today.      3.  Follow up instructions include:    Follow up with primary care provider as discussed    SUBJECTIVE:                                                    The patient is taking medication as prescribed and is tolerating well.   Patient is monitoring Blood Pressure at home.         Out of the following complicating factors: Cough, Headache, Lightheadedness, Shortness of breath, Fatigue, Nausea, Sexual Dysfunction, New onset of swelling or edema, Weakness and New onset of Chest Pain, the patient reports:  None    OBJECTIVE:                                                    Is pulse 55 or greater? - Yes  Pulse Readings from Last 1 Encounters:   05/25/18 78     Today's BP completed using cuff size: large on left side  arm.  BP Readings from Last 3 Encounters:   05/25/18 124/68   03/26/18 144/82   01/24/18 150/86       Current Outpatient Prescriptions   Medication Sig Dispense Refill     lisinopril (PRINIVIL/ZESTRIL) 20 MG tablet Take 1 tablet (20 mg) by mouth daily 30 tablet 0     metroNIDAZOLE (FLAGYL) 500 MG tablet Take 1 tablet (500 mg) by mouth 2 times daily 14 tablet 0     Multiple Vitamins-Minerals (MULTIVITAMIN PO)        norgestimate-ethinyl estradiol (ORTHO-CYCLEN, SPRINTEC) 0.25-35 MG-MCG per tablet Take 1 tablet by mouth daily 84 tablet 3     Potassium   Date Value Ref Range Status   04/30/2018 4.0 3.4 - 5.3 mmol/L Final     Creatinine   Date Value Ref Range Status   04/30/2018 0.73 0.52 - 1.04 mg/dL Final     Urea Nitrogen   Date Value Ref Range Status   04/30/2018 16 7 - 30 mg/dL Final     GFR Estimate   Date Value  Ref Range Status   04/30/2018 >90 >60 mL/min/1.7m2 Final     Comment:     Non  GFR Calc      A baseline potassium, creatinine, BUN, GFR has been done within past 12 months    Education:  general discussion/verbal explanation  These interventions were used: Collaboration, Evocation, Support autonomy, Permission to raise concern or advise and Open ended questions  Education material provided and patient was given an opportunity to ask questions.    Patient verbalized understanding of this plan and is agreeable.    Will route to Alivia Hercules CNP--- PCP to review and advise if any changes or follow up.   Yuli Perez, CAITIE

## 2018-05-25 NOTE — MR AVS SNAPSHOT
After Visit Summary   5/25/2018    Radha Rodriguez    MRN: [id number]           Patient Information     Date Of Birth          1985        Visit Information        Provider Department      5/25/2018 2:00 PM [facility] [place]        Today's Diagnoses     BP check    -  1       Follow-ups after your visit        Who to contact     If you have questions or need follow up information about today's clinic visit or your schedule please contact [facility] [place] directly at [phone].  Normal or non-critical lab and imaging results will be communicated to you by MyChart, letter or phone within 4 business days after the clinic has received the results. If you do not hear from us within 7 days, please contact the clinic through [facility] or phone. If you have a critical or abnormal lab result, we will notify you by phone as soon as possible.  Submit refill requests through [employer] or call your pharmacy and they will forward the refill request to us. Please allow 3 business days for your refill to be completed.          Additional Information About Your Visit        MyChart Information     [employer] gives you secure access to your electronic health record. If you see a primary care provider, you can also send messages to your care team and make appointments. If you have questions, please call your primary care clinic.  If you do not have a primary care provider, please call [phone] and they will assist you.        Care EveryWhere ID     This is your Care EveryWhere ID. This could be used by other organizations to access your [place] medical records  [id number]        Your Vitals Were     Pulse                   78            Blood Pressure from Last 3 Encounters:   05/25/18 124/68   03/26/18 144/82   01/24/18 150/86    Weight from Last 3 Encounters:   03/26/18 248 lb (112.5 kg)   01/08/18 248 lb 11.2 oz (112.8 kg)              Today, you had the following     No  orders found for display       Primary Care Provider Office Phone # Fax #    Alivia Hercules, APRN [facility] [phone] [address] [place] [address]        Equal Access to Services     ASHTYN TREVINO : Hadii aad ku hadaro Sobernardoali, waaxda luqadaha, qaybta kaalmada adejaneyada, sylvain batista marquisejane krishna ben mcguire. So [facility] [phone].    ATENCIÓN: Si habla español, tiene a george disposición servicios gratuitos de asistencia lingüística. Llame al [phone].    We comply with applicable federal civil rights laws and [place] laws. We do not discriminate on the basis of race, color, national origin, age, disability, sex, sexual orientation, or gender identity.            Thank you!     [facility]. Our goal is always to provide you with excellent care. Hearing back from our patients is one way we can continue to improve our services. Please take a few minutes to complete the written survey that you may receive in the mail after your visit with us. Thank you!             Your Updated Medication List - Protect others around you: Learn how to safely use, store and throw away your medicines at www.disposemymeds.org.          This list is accurate as of 5/25/18  2:29 PM.  Always use your most recent med list.                   Brand Name Dispense Instructions for use Diagnosis    lisinopril 20 MG tablet    PRINIVIL/ZESTRIL    30 tablet    Take 1 tablet (20 mg) by mouth daily    Benign essential hypertension       metroNIDAZOLE 500 MG tablet    FLAGYL    14 tablet    Take 1 tablet (500 mg) by mouth 2 times daily    Bacterial vaginosis       MULTIVITAMIN PO           norgestimate-ethinyl estradiol 0.25-35 MG-MCG per tablet    ORTHO-CYCLEN, SPRINTEC    84 tablet    Take 1 tablet by mouth daily    Encounter for contraceptive management, unspecified type

## 2018-05-27 DIAGNOSIS — I10 BENIGN ESSENTIAL HYPERTENSION: ICD-10-CM

## 2018-05-27 RX ORDER — LISINOPRIL 20 MG/1
20 TABLET ORAL DAILY
Qty: 30 TABLET | Refills: 0 | Status: CANCELLED | OUTPATIENT
Start: 2018-05-27

## 2018-05-27 NOTE — TELEPHONE ENCOUNTER
"Requested Prescriptions   Pending Prescriptions Disp Refills     lisinopril (PRINIVIL/ZESTRIL) 20 MG tablet  Last Written Prescription Date:  05/25/2018  Last Fill Quantity: 30 tablet,  # refills: 0   Last office visit: 3/26/2018 with prescribing provider:      Alivia Hercules APRN CNP         Future Office Visit:     30 tablet 0     Sig: Take 1 tablet (20 mg) by mouth daily    ACE Inhibitors (Including Combos) Protocol Passed    5/27/2018  1:18 PM       Passed - Blood pressure under 140/90 in past 12 months    BP Readings from Last 3 Encounters:   05/25/18 124/68   03/26/18 144/82   01/24/18 150/86                Passed - Recent (12 mo) or future (30 days) visit within the authorizing provider's specialty    Patient had office visit in the last 12 months or has a visit in the next 30 days with authorizing provider or within the authorizing provider's specialty.  See \"Patient Info\" tab in inbasket, or \"Choose Columns\" in Meds & Orders section of the refill encounter.           Passed - Patient is age 18 or older       Passed - No active pregnancy on record       Passed - Normal serum creatinine on file in past 12 months    Recent Labs   Lab Test  04/30/18   1522   CR  0.73            Passed - Normal serum potassium on file in past 12 months    Recent Labs   Lab Test  04/30/18   1522   POTASSIUM  4.0            Passed - No positive pregnancy test in past 12 months          "

## 2018-05-28 RX ORDER — LISINOPRIL 20 MG/1
20 TABLET ORAL DAILY
Qty: 90 TABLET | Refills: 2 | Status: SHIPPED | OUTPATIENT
Start: 2018-05-28 | End: 2018-06-25

## 2018-06-25 DIAGNOSIS — I10 BENIGN ESSENTIAL HYPERTENSION: ICD-10-CM

## 2018-06-25 NOTE — TELEPHONE ENCOUNTER
"Requested Prescriptions   Pending Prescriptions Disp Refills     lisinopril (PRINIVIL/ZESTRIL) 20 MG tablet  Last Written Prescription Date:  05/28/2018  Last Fill Quantity: 90 tablet,  # refills: 2   Last office visit: 3/26/2018 with prescribing provider:  Alivia Hercules APRN CNP    Future Office Visit:     90 tablet 2     Sig: Take 1 tablet (20 mg) by mouth daily    ACE Inhibitors (Including Combos) Protocol Passed    6/25/2018  2:07 PM       Passed - Blood pressure under 140/90 in past 12 months    BP Readings from Last 3 Encounters:   05/25/18 124/68   03/26/18 144/82   01/24/18 150/86                Passed - Recent (12 mo) or future (30 days) visit within the authorizing provider's specialty    Patient had office visit in the last 12 months or has a visit in the next 30 days with authorizing provider or within the authorizing provider's specialty.  See \"Patient Info\" tab in inbasket, or \"Choose Columns\" in Meds & Orders section of the refill encounter.           Passed - Patient is age 18 or older       Passed - No active pregnancy on record       Passed - Normal serum creatinine on file in past 12 months    Recent Labs   Lab Test  04/30/18   1522   CR  0.73            Passed - Normal serum potassium on file in past 12 months    Recent Labs   Lab Test  04/30/18   1522   POTASSIUM  4.0            Passed - No positive pregnancy test in past 12 months          "

## 2018-06-26 RX ORDER — LISINOPRIL 20 MG/1
20 TABLET ORAL DAILY
Qty: 90 TABLET | Refills: 2 | Status: SHIPPED | OUTPATIENT
Start: 2018-06-26 | End: 2019-01-18

## 2018-06-26 NOTE — TELEPHONE ENCOUNTER
Prescription resent.     Prescription approved per [facility] Refill Protocol.    Rowan MARTINEZ RN, BSN, PHN  [place] [facility] RN

## 2018-09-13 ENCOUNTER — THERAPY VISIT (OUTPATIENT)
Dept: CHIROPRACTIC MEDICINE | Facility: CLINIC | Age: 33
End: 2018-09-13
Payer: COMMERCIAL

## 2018-09-13 DIAGNOSIS — M99.08 SEGMENTAL DYSFUNCTION OF RIB CAGE: ICD-10-CM

## 2018-09-13 DIAGNOSIS — M99.02 SEGMENTAL DYSFUNCTION OF THORACIC REGION: Primary | ICD-10-CM

## 2018-09-13 DIAGNOSIS — M54.6 PAIN IN THORACIC SPINE: ICD-10-CM

## 2018-09-13 PROCEDURE — 98940 CHIROPRACT MANJ 1-2 REGIONS: CPT | Mod: AT | Performed by: CHIROPRACTOR

## 2018-09-13 PROCEDURE — 99202 OFFICE O/P NEW SF 15 MIN: CPT | Mod: 25 | Performed by: CHIROPRACTOR

## 2018-09-13 NOTE — MR AVS SNAPSHOT
After Visit Summary   9/13/2018    Radha Rodriguez    MRN: [id number]           Patient Information     Date Of Birth          1985        Visit Information        Provider Department      9/13/2018 4:45 PM Elly Pablo DC [place] [facility]        Today's Diagnoses     Segmental dysfunction of thoracic region    -  1    Segmental dysfunction of rib cage        Pain in thoracic spine           Follow-ups after your visit        Who to contact     If you have questions or need follow up information about today's clinic visit or your schedule please contact [place] [facility] directly at [phone].  Normal or non-critical lab and imaging results will be communicated to you by Quorumhart, letter or phone within 4 business days after the clinic has received the results. If you do not hear from us within 7 days, please contact the clinic through Comic Replyt or phone. If you have a critical or abnormal lab result, we will notify you by phone as soon as possible.  Submit refill requests through [email or web address] or call your pharmacy and they will forward the refill request to us. Please allow 3 business days for your refill to be completed.          Additional Information About Your Visit        MyChart Information     [email or web address] gives you secure access to your electronic health record. If you see a primary care provider, you can also send messages to your care team and make appointments. If you have questions, please call your primary care clinic.  If you do not have a primary care provider, please call [phone] and they will assist you.        Care EveryWhere ID     This is your Care EveryWhere ID. This could be used by other organizations to access your [place] medical records  [id number]         Blood Pressure from Last 3 Encounters:   05/25/18 124/68   03/26/18 144/82   01/24/18 150/86    Weight from Last 3 Encounters:   03/26/18 112.5 kg  (248 lb)   01/08/18 112.8 kg (248 lb 11.2 oz)              We Performed the Following     CHIROPRAC MANIP,SPINAL,1-2 REGIONS     OFFICE/OUTPT VISIT,RAUDEL HERRERA II        Primary Care Provider Office Phone # Fax #    MONA Wei ANTONIO [phone] [address] [place] [address]        Equal Access to Services     [facility]: Hadii aad ku hadasho Soomaali, waaxda luqadaha, qaybta kaalmada adeegyada, waxay idiin hayaan adeeg kharash la'aan . So [facility] [phone].    ATENCIÓN: Si christophe saunders, tiene a george disposición servicios gratuitos de asistencia lingüística. Llame al [phone].    We comply with applicable federal civil rights laws and [place] laws. We do not discriminate on the basis of race, color, national origin, age, disability, sex, sexual orientation, or gender identity.            Thank you!     Thank you for choosing [place] [facility]  for your care. Our goal is always to provide you with excellent care. Hearing back from our patients is one way we can continue to improve our services. Please take a few minutes to complete the written survey that you may receive in the mail after your visit with us. Thank you!             Your Updated Medication List - Protect others around you: Learn how to safely use, store and throw away your medicines at www.disposemymeds.org.          This list is accurate as of 9/13/18  5:10 PM.  Always use your most recent med list.                   Brand Name Dispense Instructions for use Diagnosis    lisinopril 20 MG tablet    PRINIVIL/ZESTRIL    90 tablet    Take 1 tablet (20 mg) by mouth daily    Benign essential hypertension       metroNIDAZOLE 500 MG tablet    FLAGYL    14 tablet    Take 1 tablet (500 mg) by mouth 2 times daily    Bacterial vaginosis       MULTIVITAMIN PO           norgestimate-ethinyl estradiol 0.25-35 MG-MCG per tablet    ORTHO-CYCLEN, SPRINTEC    84 tablet    Take 1  tablet by mouth daily    Encounter for contraceptive management, unspecified type

## 2018-09-13 NOTE — PROGRESS NOTES
Chiropractic Clinic Visit    PCP: Alivia Hercules    Radha Rodriguez is a 32 year old female who is seen  as a self referral presenting with pain between her scapula. Her pain started 2 weeks ago with no known cause. She states that she always has knots but they usually work themselves out and she has a spot on the right side that won't go away. The pain is rated 4/10, and it is sharp when it hits. She notices it more when she is sitting on the couch. She stands a lot at work, and it doesn't affect her as much when she is at work. She has not used ice or heat because she can't reach. She feels like she can't take a full breath in. She is sleeping fine at night. She denies neck pain, headaches or radiating pain into her UE. She has had this off and on in the past, this one seems to be really bad.       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    Past Medical History:   Diagnosis Date     PCOS (polycystic  ovarian syndrome)      Past Surgical History:   Procedure Laterality Date     NO HISTORY OF SURGERY         Objective  There were no vitals taken for this visit.    GENERAL APPEARANCE: healthy, alert and no distress   GAIT: NORMAL  SKIN: no suspicious lesions or rashes  NEURO: Normal strength and tone, mentation intact and speech normal  PSYCH:  mentation appears normal and affect normal/bright    Radha was asked to complete the Neck Disability Index, today in the office. NDI Disability score: 14%; pain severity scale: 4/10.    Cervical Spine Exam    Range of Motion:         WNL    Inspection:         No visible deformity        normal lordotic curvature maintained    Tender:        Right scapular region and rib/TP junction      Muscle strength:       C5 (shoulder abduction) symmetric 5/5 Normal       C6 (elbow flexion) symmetric 5/5 Normal       C7 (elbow extension) symmetric 5/5 Normal       C8 (finger abduction, thumb flexion) symmetric 5/5 Normal    Reflexes:        C5 (biceps) symmetric 2 bilaterally       C6 (supinator) symmetric 2 bilaterally       C7 (triceps) symmetric 2 bilaterally    Sensation:       grossly intact througout bilateral upper extremities    Special Tests:       neg (-) Spurling  Gaurav's- negative, Distraction - negative and Shoulder depression - Right negative and Left negative    Lymphatics:        no edema noted in the upper extremities       Segmental spinal dysfunction/restrictions found at:  T1 RR, LRR  T5 RR, LRR with right posterior 5th rib  T9 E, FR.        Muscle spasm found in:Intercostal, T-spine paraspinal and Traps      Radiology:  None warranted at this time, consider if no improvement with conservative management.    Assessment:    No diagnosis found.    RX ordered/plan of care: Mechanical back pain with rib dysfunction, with associated myospasm and intersegmental dysfunction.   Anticipated outcomes: Patient is expected to get relief with care.   Possible risks and side  effects: Minimal soreness expected post-adjustment.     After discussing the risk and benefits of care, patient consented to treatment    Patient's condition:  Patient had restrictions pre-manipulation    Treatment effectiveness:  Post manipulation there is better intersegmental movement and Patient claims to feel looser post manipulation    Plan:    Procedures:    Evaluation and Management:  14577 Low to moderate level exam 20 min    CMT:  45569 Chiropractic manipulative treatment 1-2 regions performed   Thoracic: Diversified, T1, T5, T9, Prone    Modalities:  27340: MSTM:  To Intercostal, T-spine paraspinal and Traps  for 5 min    Therapeutic procedures:  Gave patient Ice instructions post adjustment, and instructions for acute care    Response to Treatment:  Patient tolerated treatment well today, and felt immediate relief.     Prognosis: Good      Treatment plan and goals:  Goals:  Decrease pain in right scapular region.  Avoid exacerbations of rib dysfunction.     Frequency of care  Duration of care is estimated to be 4 weeks, from the initial treatment.  It is estimated that the patient will need a total of 6 visits to resolve this episode.  For the initial therapeutic trial of care, the frequency is recommended at 1-2 times per week.  A reevaluation would be clinically appropriate in 6 visits, to determine progress and further course of care.    In-Office Treatment  Evaluation  Spinal Chiropractic Manipulative Therapy:  Trial of care - re-evaluate after 6 visits.       Recommendations:    Instructions:ice 20 minutes every other hour as needed    Follow-up:  Return to care in next week.     Disclaimer: This note consists of symbols derived from keyboarding, dictation and/or voice recognition software. As a result, there may be errors in the script that have gone undetected. Please consider this when interpreting information found in this chart.

## 2018-09-27 ENCOUNTER — THERAPY VISIT (OUTPATIENT)
Dept: CHIROPRACTIC MEDICINE | Facility: CLINIC | Age: 33
End: 2018-09-27
Payer: COMMERCIAL

## 2018-09-27 DIAGNOSIS — M54.6 PAIN IN THORACIC SPINE: ICD-10-CM

## 2018-09-27 DIAGNOSIS — M99.08 SEGMENTAL DYSFUNCTION OF RIB CAGE: ICD-10-CM

## 2018-09-27 DIAGNOSIS — M99.02 SEGMENTAL DYSFUNCTION OF THORACIC REGION: ICD-10-CM

## 2018-09-27 PROCEDURE — 98940 CHIROPRACT MANJ 1-2 REGIONS: CPT | Mod: AT | Performed by: CHIROPRACTOR

## 2018-09-27 NOTE — MR AVS SNAPSHOT
After Visit Summary   9/27/2018    Radha Rodriguez    MRN: [id number]           Patient Information     Date Of Birth          1985        Visit Information        Provider Department      9/27/2018 5:00 PM Elly Pablo, SA[facility] [place] [facility]        Today's Diagnoses     Segmental dysfunction of thoracic region        Pain in thoracic spine        Segmental dysfunction of rib cage           Follow-ups after your visit        Who to contact     If you have questions or need follow up information about today's clinic visit or your schedule please contact [place] [facility] directly at [phone].  Normal or non-critical lab and imaging results will be communicated to you by Sanergyhart, letter or phone within 4 business days after the clinic has received the results. If you do not hear from us within 7 days, please contact the clinic through Green Zebra Groceryt or phone. If you have a critical or abnormal lab result, we will notify you by phone as soon as possible.  Submit refill requests through [employer] or call your pharmacy and they will forward the refill request to us. Please allow 3 business days for your refill to be completed.          Additional Information About Your Visit        MyChart Information     [employer] gives you secure access to your electronic health record. If you see a primary care provider, you can also send messages to your care team and make appointments. If you have questions, please call your primary care clinic.  If you do not have a primary care provider, please call [phone] and they will assist you.        Care EveryWhere ID     This is your Care EveryWhere ID. This could be used by other organizations to access your [place] medical records  [id number]         Blood Pressure from Last 3 Encounters:   05/25/18 124/68   03/26/18 144/82   01/24/18 150/86    Weight from Last 3 Encounters:   03/26/18 112.5 kg (248  lb)   01/08/18 112.8 kg (248 lb 11.2 oz)              We Performed the Following     CHIROPRAC MANIP,SPINAL,1-2 REGIONS        Primary Care Provider Office Phone # Fax #    Alivia MONA Mcneil [facility] [phone] [address] [place] [address]        Equal Access to Services     [facility] : Hadii aad ku hadasho Soomaali, waaxda luqadaha, qaybta kaalmada adeegyada, waxay idiin hayaan adeeg khannadilma lashayla . So [facility] [phone].    ATENCIÓN: Si habla espyvette, tiene a george disposición servicios gratuitos de asistencia lingüística. [facility] [phone].    We comply with applicable federal civil rights laws and [place] laws. We do not discriminate on the basis of race, color, national origin, age, disability, sex, sexual orientation, or gender identity.            Thank you!     Thank you for choosing [place] [facility]  for your care. Our goal is always to provide you with excellent care. Hearing back from our patients is one way we can continue to improve our services. Please take a few minutes to complete the written survey that you may receive in the mail after your visit with us. Thank you!             Your Updated Medication List - Protect others around you: Learn how to safely use, store and throw away your medicines at www.disposemymeds.org.          This list is accurate as of 9/27/18  5:16 PM.  Always use your most recent med list.                   Brand Name Dispense Instructions for use Diagnosis    lisinopril 20 MG tablet    PRINIVIL/ZESTRIL    90 tablet    Take 1 tablet (20 mg) by mouth daily    Benign essential hypertension       metroNIDAZOLE 500 MG tablet    FLAGYL    14 tablet    Take 1 tablet (500 mg) by mouth 2 times daily    Bacterial vaginosis       MULTIVITAMIN PO           norgestimate-ethinyl estradiol 0.25-35 MG-MCG per tablet    ORTHO-CYCLEN, SPRINTEC    84 tablet    Take 1 tablet by mouth daily    Encounter for  contraceptive management, unspecified type

## 2018-09-27 NOTE — PROGRESS NOTES
Visit #:  2 of 8 based on treatment plan 9/13/2018    Subjective:  Radha Rodriguez is a 32 year old female who is seen in f/u up for:        Segmental dysfunction of thoracic region  Pain in thoracic spine  Segmental dysfunction of rib cage.     Since last visit on 9/13/2018,  Radha Rodriguez reports the following changes: Patient presents and states that she is doing really well. She just got back from a road trip to [facility]. She had some pain and tightness with traveling and sleeping in different beds, but has done really well. She has found it really helps to stretch her shoulders back as instructed at last visit.            Objective:  The following was observed:    P: palpatory tenderness    A: static palpation demonstrates intersegmental asymmetry     R: motion palpation notes restricted motion    T: localized muscle spasm at: Rhomboids, T-spine paraspinal and Traps R>>L      Assessment:    Segmental spinal dysfunction/restrictions found at:  T1   T5  T10    Diagnoses:      1. Segmental dysfunction of thoracic region    2. Pain in thoracic spine    3. Segmental dysfunction of rib cage        Patient's condition:  Patient had restrictions pre-manipulation    Treatment effectiveness:  Post manipulation there is better intersegmental movement and Patient claims to feel looser post manipulation      Procedures:  CMT:  45434 Chiropractic manipulative treatment 1-2 regions performed   Thoracic: Diversified, T1, T5, T10, Prone    Modalities:  16880: MSTM:  To Rhomboids, T-spine paraspinal and Traps  for 5 min    Therapeutic procedures:  Gave patient Ice instructions post adjustment, and instructions for acute care      Prognosis: Excellent    Progress towards Goals: Patient is making progress towards the goal of:  Decrease pain in right scapular region.  Avoid exacerbations of rib dysfunction.      Response to Treatment:   Reduction of symptoms with care.       Recommendations:    Instructions:ice 20 minutes every other hour  as needed and stretch as instructed at visit    Follow-up:  Continue treatment PRN.

## 2018-11-04 ENCOUNTER — OFFICE VISIT (OUTPATIENT)
Dept: URGENT CARE | Facility: URGENT CARE | Age: 33
End: 2018-11-04
Payer: COMMERCIAL

## 2018-11-04 VITALS
BODY MASS INDEX: 36.71 KG/M2 | TEMPERATURE: 97.5 F | SYSTOLIC BLOOD PRESSURE: 124 MMHG | OXYGEN SATURATION: 100 % | HEART RATE: 83 BPM | WEIGHT: 234.4 LBS | DIASTOLIC BLOOD PRESSURE: 80 MMHG

## 2018-11-04 DIAGNOSIS — R07.0 THROAT PAIN: Primary | ICD-10-CM

## 2018-11-04 LAB
DEPRECATED S PYO AG THROAT QL EIA: NORMAL
SPECIMEN SOURCE: NORMAL

## 2018-11-04 PROCEDURE — 87880 STREP A ASSAY W/OPTIC: CPT | Performed by: PHYSICIAN ASSISTANT

## 2018-11-04 PROCEDURE — 87081 CULTURE SCREEN ONLY: CPT | Performed by: PHYSICIAN ASSISTANT

## 2018-11-04 PROCEDURE — 99213 OFFICE O/P EST LOW 20 MIN: CPT | Performed by: PHYSICIAN ASSISTANT

## 2018-11-04 NOTE — MR AVS SNAPSHOT
After Visit Summary   11/4/2018    Radha Rodriguez    MRN: [id number]           Patient Information     Date Of Birth          1985        Visit Information        Provider Department      11/4/2018 10:15 AM Donna Lara PA-C [facility]        Today's Diagnoses     Throat pain    -  1       Follow-ups after your visit        Who to contact     If you have questions or need follow up information about today's clinic visit or your schedule please contact [facility] directly at [phone].  Normal or non-critical lab and imaging results will be communicated to you by eGameshart, letter or phone within 4 business days after the clinic has received the results. If you do not hear from us within 7 days, please contact the clinic through Solar Component[employer] or phone. If you have a critical or abnormal lab result, we will notify you by phone as soon as possible.  Submit refill requests through [employer] or call your pharmacy and they will forward the refill request to us. Please allow 3 business days for your refill to be completed.          Additional Information About Your Visit        MyChart Information     [employer] gives you secure access to your electronic health record. If you see a primary care provider, you can also send messages to your care team and make appointments. If you have questions, please call your primary care clinic.  If you do not have a primary care provider, please call [phone] and they will assist you.        Care EveryWhere ID     This is your Care EveryWhere ID. This could be used by other organizations to access your [place] medical records  [id number]        Your Vitals Were     Pulse Temperature Pulse Oximetry BMI (Body Mass Index)          83 97.5  F (36.4  C) (Tympanic) 100% 36.71 kg/m2         Blood Pressure from Last 3 Encounters:   11/04/18 124/80   05/25/18 124/68   03/26/18 144/82    Weight from Last 3 Encounters:   11/04/18 234 lb 6.4  oz (106.3 kg)   03/26/18 248 lb (112.5 kg)   01/08/18 248 lb 11.2 oz (112.8 kg)              We Performed the Following     Beta strep group A culture     Rapid strep screen          Today's Medication Changes          These changes are accurate as of 11/4/18 11:08 AM.  If you have any questions, ask your nurse or doctor.               Stop taking these medicines if you haven't already. Please contact your care team if you have questions.     metroNIDAZOLE 500 MG tablet   Commonly known as:  FLAGYL   Stopped by:  Donna Lara PA-C                    Primary Care Provider Office Phone # Fax #    Alivia Hercules, APRN [facility] [phone] [address] [place] [address]        Equal Access to Services     [facility]: Yg odell Sorufina, waaxda luqadaha, qaybta kaalmada adejaneyashelley, sylvain contreras . So [facility] [phone].    ATENCIÓN: Si habla español, tiene a george disposición servicios gratuitos de asistencia lingüística. [facility] [phone].    We comply with applicable federal civil rights laws and [place] laws. We do not discriminate on the basis of race, color, national origin, age, disability, sex, sexual orientation, or gender identity.            Thank you!     Thank you for choosing [facility]  for your care. Our goal is always to provide you with excellent care. Hearing back from our patients is one way we can continue to improve our services. Please take a few minutes to complete the written survey that you may receive in the mail after your visit with us. Thank you!             Your Updated Medication List - Protect others around you: Learn how to safely use, store and throw away your medicines at www.disposemymeds.org.          This list is accurate as of 11/4/18 11:08 AM.  Always use your most recent med list.                   Brand Name Dispense Instructions for use Diagnosis    lisinopril 20 MG tablet     PRINIVIL/ZESTRIL    90 tablet    Take 1 tablet (20 mg) by mouth daily    Benign essential hypertension       MULTIVITAMIN PO           norgestimate-ethinyl estradiol 0.25-35 MG-MCG per tablet    ORTHO-CYCLEN, SPRINTEC    84 tablet    Take 1 tablet by mouth daily    Encounter for contraceptive management, unspecified type

## 2018-11-04 NOTE — PROGRESS NOTES
SUBJECTIVE:  Radha Rodriguez is a 32 year old female with a chief complaint of sore throat  Onset of symptoms was 1 day(s) ago.    No fever, HA, GI sx or other associated URI sx.  States that white spots on tonsils.  Has ENT referral but not gone.  Hx of strep . Works in healthcare and exposures.     Course of illness: gradual onset and still present.  Severity mild  Current and Associated symptoms: negative other than stated above  Treatment measures tried include Tylenol/Ibuprofen, Fluids and Rest.  Predisposing factors include hx of strep.    Past Medical History:   Diagnosis Date     PCOS (polycystic ovarian syndrome)      Current Outpatient Prescriptions   Medication Sig Dispense Refill     lisinopril (PRINIVIL/ZESTRIL) 20 MG tablet Take 1 tablet (20 mg) by mouth daily 90 tablet 2     Multiple Vitamins-Minerals (MULTIVITAMIN PO)        norgestimate-ethinyl estradiol (ORTHO-CYCLEN, SPRINTEC) 0.25-35 MG-MCG per tablet Take 1 tablet by mouth daily 84 tablet 3     Social History   Substance Use Topics     Smoking status: Never Smoker     Smokeless tobacco: Never Used     Alcohol use Yes      Comment: 1 per month       ROS:  Review of systems negative except as stated above.    OBJECTIVE:   /80  Pulse 83  Temp 97.5  F (36.4  C) (Tympanic)  Wt 234 lb 6.4 oz (106.3 kg)  SpO2 100%  BMI 36.71 kg/m2  GENERAL APPEARANCE: healthy, alert and no distress  EYES: EOMI,  PERRL, conjunctiva clear  HENT: TM's normal bilaterally and oral mucous membranes moist, no erythema noted.  Few white spots on throat.  Uvula midline and no abscess noted.    NECK: supple, non-tender to palpation, no adenopathy noted  RESP: lungs clear to auscultation - no rales, rhonchi or wheezes  CV: regular rates and rhythm, normal S1 S2, no murmur noted  SKIN: no suspicious lesions or rashes    Rapid Strep test is negative; await throat culture results.    ASSESSMENT:   Throat pain    PLAN:   Culture pending.    Symptomatic treat with gargles,  lozenges, and OTC analgesic as needed. Follow-up with primary clinic if not improving.

## 2018-11-05 LAB
BACTERIA SPEC CULT: NORMAL
SPECIMEN SOURCE: NORMAL

## 2018-12-08 ENCOUNTER — OFFICE VISIT (OUTPATIENT)
Dept: FAMILY MEDICINE | Facility: CLINIC | Age: 33
End: 2018-12-08
Payer: COMMERCIAL

## 2018-12-08 VITALS
SYSTOLIC BLOOD PRESSURE: 132 MMHG | TEMPERATURE: 98.7 F | HEIGHT: 67 IN | HEART RATE: 122 BPM | DIASTOLIC BLOOD PRESSURE: 84 MMHG | BODY MASS INDEX: 36.71 KG/M2

## 2018-12-08 DIAGNOSIS — Z23 NEED FOR TDAP VACCINATION: ICD-10-CM

## 2018-12-08 DIAGNOSIS — J01.90 ACUTE SINUSITIS WITH SYMPTOMS > 10 DAYS: Primary | ICD-10-CM

## 2018-12-08 DIAGNOSIS — E66.01 MORBID OBESITY (H): ICD-10-CM

## 2018-12-08 PROCEDURE — 90715 TDAP VACCINE 7 YRS/> IM: CPT | Performed by: FAMILY MEDICINE

## 2018-12-08 PROCEDURE — 99213 OFFICE O/P EST LOW 20 MIN: CPT | Mod: 25 | Performed by: FAMILY MEDICINE

## 2018-12-08 PROCEDURE — 90471 IMMUNIZATION ADMIN: CPT | Performed by: FAMILY MEDICINE

## 2018-12-08 RX ORDER — DOXYCYCLINE 100 MG/1
100 CAPSULE ORAL 2 TIMES DAILY
Qty: 20 CAPSULE | Refills: 0 | Status: SHIPPED | OUTPATIENT
Start: 2018-12-08 | End: 2019-01-18

## 2018-12-08 NOTE — MR AVS SNAPSHOT
After Visit Summary   12/8/2018    Radha Rodriguez    MRN: [id number]           Patient Information     Date Of Birth          1985        Visit Information        Provider Department      12/8/2018 10:40 AM Jair Mora Jr., MD [facility]        Today's Diagnoses     Acute sinusitis with symptoms > 10 days    -  1    Morbid obesity (H)        Need for Tdap vaccination           Follow-ups after your visit        Follow-up notes from your care team     Return in about 4 weeks (around 1/5/2019) for Annual preventative exam with Alivia Hercules DNP, Preventative Visit.      Who to contact     If you have questions or need follow up information about today's clinic visit or your schedule please contact [facility] directly at [phone].  Normal or non-critical lab and imaging results will be communicated to you by MyChart, letter or phone within 4 business days after the clinic has received the results. If you do not hear from us within 7 days, please contact the clinic through MyChart or phone. If you have a critical or abnormal lab result, we will notify you by phone as soon as possible.  Submit refill requests through [employer] or call your pharmacy and they will forward the refill request to us. Please allow 3 business days for your refill to be completed.          Additional Information About Your Visit        MyChart Information     [employer] gives you secure access to your electronic health record. If you see a primary care provider, you can also send messages to your care team and make appointments. If you have questions, please call your primary care clinic.  If you do not have a primary care provider, please call [phone] and they will assist you.        Care EveryWhere ID     This is your Care EveryWhere ID. This could be used by other organizations to access your [place] medical records  [id number]        Your Vitals Were     Pulse Temperature  "Height BMI (Body Mass Index)          122 98.7  F (37.1  C) (Oral) 5' 7\" (1.702 m) 36.71 kg/m2         Blood Pressure from Last 3 Encounters:   12/08/18 132/84   11/04/18 124/80   05/25/18 124/68    Weight from Last 3 Encounters:   11/04/18 234 lb 6.4 oz (106.3 kg)   03/26/18 248 lb (112.5 kg)   01/08/18 248 lb 11.2 oz (112.8 kg)              We Performed the Following     TDAP, IM (10 - 64 YRS) - Adacel          Today's Medication Changes          These changes are accurate as of 12/8/18 11:12 AM.  If you have any questions, ask your nurse or doctor.               Start taking these medicines.        Dose/Directions    doxycycline hyclate 100 MG capsule   Commonly known as:  VIBRAMYCIN   Used for:  Acute sinusitis with symptoms > 10 days   Started by:  Jair Mora Jr., MD        Dose:  100 mg   Take 1 capsule (100 mg) by mouth 2 times daily   Quantity:  20 capsule   Refills:  0            Where to get your medicines      Some of these will need a paper prescription and others can be bought over the counter.  Ask your nurse if you have questions.     Bring a paper prescription for each of these medications     doxycycline hyclate 100 MG capsule                Primary Care Provider Office Phone # Fax #    MONA Wei [facility] [phone] [address] [place] [address]        Equal Access to Services     [facility] Center[facility] AH: Hadii dae ku hadasho Soomaali, waaxda luqadaha, qaybta kaalmada adeegyada, waxay massiel contreras . So [facility] [phone].    ATENCIÓN: Si habla español, tiene a george disposición servicios gratuitos de asistencia lingüística. Llame al [phone].    We comply with applicable federal civil rights laws and [place] laws. We do not discriminate on the basis of race, color, national origin, age, disability, sex, sexual orientation, or gender identity.            Thank you!     Thank you for choosing [facility]  for your " care. Our goal is always to provide you with excellent care. Hearing back from our patients is one way we can continue to improve our services. Please take a few minutes to complete the written survey that you may receive in the mail after your visit with us. Thank you!             Your Updated Medication List - Protect others around you: Learn how to safely use, store and throw away your medicines at www.disposemymeds.org.          This list is accurate as of 12/8/18 11:12 AM.  Always use your most recent med list.                   Brand Name Dispense Instructions for use Diagnosis    doxycycline hyclate 100 MG capsule    VIBRAMYCIN    20 capsule    Take 1 capsule (100 mg) by mouth 2 times daily    Acute sinusitis with symptoms > 10 days       lisinopril 20 MG tablet    PRINIVIL/ZESTRIL    90 tablet    Take 1 tablet (20 mg) by mouth daily    Benign essential hypertension       MULTIVITAMIN PO           norgestimate-ethinyl estradiol 0.25-35 MG-MCG tablet    ORTHO-CYCLEN/SPRINTEC    84 tablet    Take 1 tablet by mouth daily    Encounter for contraceptive management, unspecified type

## 2018-12-08 NOTE — PROGRESS NOTES
"  SUBJECTIVE:   Radha Rodriguez is a 33 year old female who presents to clinic today for the following health issues:      Acute Illness   Acute illness concerns: sinus, ear, headache  Onset: about 10 days    Fever: no    Chills/Sweats: no    Headache (location?): yes    Sinus Pressure:yes    Conjunctivitis:  no    Ear Pain: no    Rhinorrhea: YES    Congestion: YES    Sore Throat: no     Cough: YES    Wheeze: no    Decreased Appetite: no    Nausea: no    Vomiting: no    Diarrhea:  no    Dysuria/Freq.: no    Fatigue/Achiness: no    Sick/Strep Exposure: YES     Therapies Tried and outcome:           Problem list and histories reviewed & adjusted, as indicated.  Additional history: as documented    BP Readings from Last 3 Encounters:   12/08/18 132/84   11/04/18 124/80   05/25/18 124/68    Wt Readings from Last 3 Encounters:   11/04/18 106.3 kg (234 lb 6.4 oz)   03/26/18 112.5 kg (248 lb)   01/08/18 112.8 kg (248 lb 11.2 oz)                    Reviewed and updated as needed this visit by clinical staff  Allergies  Meds       Reviewed and updated as needed this visit by Provider         ROS:  Constitutional, HEENT, cardiovascular, pulmonary, gi and gu systems are negative, except as otherwise noted.    OBJECTIVE:     /84 (BP Location: Right arm, Cuff Size: Adult Regular)   Pulse 122   Temp 98.7  F (37.1  C) (Oral)   Ht 1.702 m (5' 7\")   BMI 36.71 kg/m    Body mass index is 36.71 kg/m .  GENERAL: healthy, alert and no distress  EYES: Eyes grossly normal to inspection, PERRL and conjunctivae and sclerae normal  HENT: normal cephalic/atraumatic, ear canals and TM's normal, nose and mouth without ulcers or lesions, oropharynx clear, oral mucous membranes moist and sinuses: maxillary tenderness on left  NECK: no adenopathy, no asymmetry, masses, or scars and thyroid normal to palpation  RESP: lungs clear to auscultation - no rales, rhonchi or wheezes  CV: regular rate and rhythm, normal S1 S2, no S3 or S4, no " "murmur, click or rub, no peripheral edema and peripheral pulses strong  ABDOMEN: soft, nontender, no hepatosplenomegaly, no masses and bowel sounds normal  MS: no gross musculoskeletal defects noted, no edema    Diagnostic Test Results:  none     ASSESSMENT/PLAN:         BMI:   Estimated body mass index is 36.71 kg/m  as calculated from the following:    Height as of this encounter: 1.702 m (5' 7\").    Weight as of 11/4/18: 106.3 kg (234 lb 6.4 oz).   Weight management plan: Discussed healthy diet and exercise guidelines      1. Acute sinusitis with symptoms > 10 days  likely viral.  Advised of OTC options for symptomatic treatment. Back up Rx for antibiotics given as below.  Do not start antibiotics if feeling better as I suspect she will.  Begin antibiotics if feeling worse of if symptoms persist for another five days without improvement.  - doxycycline hyclate (VIBRAMYCIN) 100 MG capsule; Take 1 capsule (100 mg) by mouth 2 times daily  Dispense: 20 capsule; Refill: 0    2. Morbid obesity (H)  Emphasized importance of healthy lifestyle including regular exercise and lower fat & lower sodium diet.     3. Need for Tdap vaccination  Updated.  - TDAP, IM (10 - 64 YRS) - Adacel    Work on weight loss  See Patient Instructions    Jair Mora Jr, MD  [facility], [place]    "

## 2018-12-14 DIAGNOSIS — Z30.9 ENCOUNTER FOR CONTRACEPTIVE MANAGEMENT, UNSPECIFIED TYPE: ICD-10-CM

## 2018-12-18 RX ORDER — NORGESTIMATE AND ETHINYL ESTRADIOL 0.25-0.035
KIT ORAL
Qty: 84 TABLET | Refills: 0 | Status: SHIPPED | OUTPATIENT
Start: 2018-12-18 | End: 2019-03-26 | Stop reason: ALTCHOICE

## 2019-01-18 ENCOUNTER — OFFICE VISIT (OUTPATIENT)
Dept: PEDIATRICS | Facility: CLINIC | Age: 34
End: 2019-01-18
Payer: COMMERCIAL

## 2019-01-18 VITALS
DIASTOLIC BLOOD PRESSURE: 68 MMHG | HEIGHT: 67 IN | SYSTOLIC BLOOD PRESSURE: 116 MMHG | HEART RATE: 92 BPM | OXYGEN SATURATION: 98 % | WEIGHT: 244.4 LBS | TEMPERATURE: 98.4 F | BODY MASS INDEX: 38.36 KG/M2

## 2019-01-18 DIAGNOSIS — I10 BENIGN ESSENTIAL HYPERTENSION: ICD-10-CM

## 2019-01-18 DIAGNOSIS — Z30.9 ENCOUNTER FOR CONTRACEPTIVE MANAGEMENT, UNSPECIFIED TYPE: ICD-10-CM

## 2019-01-18 DIAGNOSIS — Z11.4 ENCOUNTER FOR SCREENING FOR HIV: ICD-10-CM

## 2019-01-18 DIAGNOSIS — E66.01 MORBID OBESITY (H): ICD-10-CM

## 2019-01-18 DIAGNOSIS — E28.2 PCOS (POLYCYSTIC OVARIAN SYNDROME): ICD-10-CM

## 2019-01-18 DIAGNOSIS — Z00.00 HEALTHCARE MAINTENANCE: Primary | ICD-10-CM

## 2019-01-18 PROCEDURE — 99395 PREV VISIT EST AGE 18-39: CPT | Performed by: NURSE PRACTITIONER

## 2019-01-18 PROCEDURE — 99213 OFFICE O/P EST LOW 20 MIN: CPT | Mod: 25 | Performed by: NURSE PRACTITIONER

## 2019-01-18 RX ORDER — SPIRONOLACTONE 50 MG/1
50 TABLET, FILM COATED ORAL 2 TIMES DAILY
Qty: 180 TABLET | Refills: 3 | Status: SHIPPED | OUTPATIENT
Start: 2019-01-18 | End: 2019-04-18

## 2019-01-18 RX ORDER — LISINOPRIL 20 MG/1
10 TABLET ORAL DAILY
Qty: 90 TABLET | Refills: 3 | COMMUNITY
Start: 2019-01-18 | End: 2019-12-20

## 2019-01-18 RX ORDER — DROSPIRENONE AND ETHINYL ESTRADIOL 0.02-3(28)
1 KIT ORAL DAILY
Qty: 84 TABLET | Refills: 4 | Status: SHIPPED | OUTPATIENT
Start: 2019-01-18 | End: 2020-01-18

## 2019-01-18 RX ORDER — LISINOPRIL 20 MG/1
20 TABLET ORAL DAILY
Qty: 90 TABLET | Refills: 3 | Status: SHIPPED | OUTPATIENT
Start: 2019-01-18 | End: 2019-01-18

## 2019-01-18 RX ORDER — NORGESTIMATE AND ETHINYL ESTRADIOL 0.25-0.035
1 KIT ORAL DAILY
Qty: 84 TABLET | Refills: 0 | Status: CANCELLED | OUTPATIENT
Start: 2019-01-18

## 2019-01-18 ASSESSMENT — ENCOUNTER SYMPTOMS
FREQUENCY: 0
ARTHRALGIAS: 0
CONSTIPATION: 0
COUGH: 0
JOINT SWELLING: 0
PARESTHESIAS: 0
NERVOUS/ANXIOUS: 1
ABDOMINAL PAIN: 0
NAUSEA: 0
HEADACHES: 0
DIZZINESS: 0
CHILLS: 0
BREAST MASS: 0
DIARRHEA: 0
FEVER: 0
PALPITATIONS: 0
DYSURIA: 0
MYALGIAS: 0
EYE PAIN: 0
SORE THROAT: 0
HEARTBURN: 0
HEMATURIA: 0
WEAKNESS: 0
HEMATOCHEZIA: 0
SHORTNESS OF BREATH: 0

## 2019-01-18 ASSESSMENT — PATIENT HEALTH QUESTIONNAIRE - PHQ9
SUM OF ALL RESPONSES TO PHQ QUESTIONS 1-9: 7
10. IF YOU CHECKED OFF ANY PROBLEMS, HOW DIFFICULT HAVE THESE PROBLEMS MADE IT FOR YOU TO DO YOUR WORK, TAKE CARE OF THINGS AT HOME, OR GET ALONG WITH OTHER PEOPLE: SOMEWHAT DIFFICULT
SUM OF ALL RESPONSES TO PHQ QUESTIONS 1-9: 7

## 2019-01-18 ASSESSMENT — MIFFLIN-ST. JEOR: SCORE: 1851.22

## 2019-01-18 NOTE — PATIENT INSTRUCTIONS
1. Decrease lisinopril from 20 to 10  2. Start spironolactone 50mg twice a day  3. Start new birth control that will likely help with body hair. We can double the dose if needed.    ------------------------------------------------------------------------------------------------------------------------          Preventive Health Recommendations  Female Ages 26 - 39  Yearly exam:   See your health care provider every year in order to    Review health changes.     Discuss preventive care.      Review your medicines if you your doctor has prescribed any.    Until age 30: Get a Pap test every three years (more often if you have had an abnormal result).    After age 30: Talk to your doctor about whether you should have a Pap test every 3 years or have a Pap test with HPV screening every 5 years.   You do not need a Pap test if your uterus was removed (hysterectomy) and you have not had cancer.  You should be tested each year for STDs (sexually transmitted diseases), if you're at risk.   Talk to your provider about how often to have your cholesterol checked.  If you are at risk for diabetes, you should have a diabetes test (fasting glucose).  Shots: Get a flu shot each year. Get a tetanus shot every 10 years.   Nutrition:     Eat at least 5 servings of fruits and vegetables each day.    Eat whole-grain bread, whole-wheat pasta and brown rice instead of white grains and rice.    Get adequate Calcium and Vitamin D.     Lifestyle    Exercise at least 150 minutes a week (30 minutes a day, 5 days of the week). This will help you control your weight and prevent disease.    Limit alcohol to one drink per day.    No smoking.     Wear sunscreen to prevent skin cancer.    See your dentist every six months for an exam and cleaning.

## 2019-01-18 NOTE — PROGRESS NOTES
SUBJECTIVE:   CC: Radha Rodriguez is an 33 year old woman who presents for preventive health visit.     Physical   Annual:     Getting at least 3 servings of Calcium per day:  Yes    Bi-annual eye exam:  Yes    Dental care twice a year:  Yes    Sleep apnea or symptoms of sleep apnea:  Sleep apnea    Diet:  Regular (no restrictions)    Frequency of exercise:  2-3 days/week    Duration of exercise:  15-30 minutes    Taking medications regularly:  Yes    Medication side effects:  None    Additional concerns today:  Yes    PHQ-2 Total Score: 4    Concerns today: discuss possible birth control change - has PCOS - still having irregular periods even with OCP  Wondering about lisinopril causing cough - sometimes waking up in middle of night    -------------------------------------    Today's PHQ-2 Score:   PHQ-2 ( 1999 [employer]) 1/18/2019   Q1: Little interest or pleasure in doing things 2   Q2: Feeling down, depressed or hopeless 2   PHQ-2 Score 4   Q1: Little interest or pleasure in doing things More than half the days   Q2: Feeling down, depressed or hopeless More than half the days   PHQ-2 Score 4     Answers for HPI/ROS submitted by the patient on 1/18/2019   Annual Exam:  If you checked off any problems, how difficult have these problems made it for you to do your work, take care of things at home, or get along with other people?: Somewhat difficult  PHQ9 TOTAL SCORE: 7    Abuse: Current or Past(Physical, Sexual or Emotional)- No  Do you feel safe in your environment? Yes    Social History     Tobacco Use     Smoking status: Never Smoker     Smokeless tobacco: Never Used   Substance Use Topics     Alcohol use: Yes     Comment: 1 per month     Alcohol Use 1/18/2019   If you drink alcohol do you typically have greater than 3 drinks per day OR greater than 7 drinks per week? No   No flowsheet data found.    Reviewed orders with patient.  Reviewed health maintenance and updated orders accordingly - Yes  Labs reviewed in  "EPIC    Mammogram not appropriate for this patient based on age.    Pertinent mammograms are reviewed under the imaging tab.  History of abnormal Pap smear: NO - age 30-65 PAP every 5 years with negative HPV co-testing recommended  PAP / HPV Latest Ref Rng & Units 1/8/2018   PAP - NIL   HPV 16 DNA NEG:Negative Negative   HPV 18 DNA NEG:Negative Negative   OTHER HR HPV NEG:Negative Negative     Reviewed and updated as needed this visit by clinical staff  Tobacco  Allergies  Meds  Med Hx  Surg Hx  Fam Hx  Soc Hx        Reviewed and updated as needed this visit by Provider            Review of Systems   Constitutional: Negative for chills and fever.   HENT: Negative for congestion, ear pain, hearing loss and sore throat.    Eyes: Negative for pain and visual disturbance.   Respiratory: Negative for cough and shortness of breath.    Cardiovascular: Negative for chest pain, palpitations and peripheral edema.   Gastrointestinal: Negative for abdominal pain, constipation, diarrhea, heartburn, hematochezia and nausea.   Breasts:  Negative for tenderness, breast mass and discharge.   Genitourinary: Negative for dysuria, frequency, genital sores, hematuria, pelvic pain, urgency, vaginal bleeding and vaginal discharge.   Musculoskeletal: Negative for arthralgias, joint swelling and myalgias.   Skin: Negative for rash.   Neurological: Negative for dizziness, weakness, headaches and paresthesias.   Psychiatric/Behavioral: Negative for mood changes. The patient is nervous/anxious.         OBJECTIVE:   /68 (BP Location: Right arm, Cuff Size: Adult Large)   Ht 1.702 m (5' 7\")   Wt 110.9 kg (244 lb 6.4 oz)   BMI 38.28 kg/m    Physical Exam  GENERAL: healthy, alert and no distress  EYES: Eyes grossly normal to inspection, PERRL and conjunctivae and sclerae normal  HENT: ear canals and TM's normal, nose and mouth without ulcers or lesions  NECK: no adenopathy, no asymmetry, masses, or scars and thyroid normal to " palpation  RESP: lungs clear to auscultation - no rales, rhonchi or wheezes  BREAST: normal without masses, tenderness or nipple discharge and no palpable axillary masses or adenopathy  CV: regular rate and rhythm, normal S1 S2, no S3 or S4, no murmur, click or rub, no peripheral edema and peripheral pulses strong  ABDOMEN: soft, nontender, no hepatosplenomegaly, no masses and bowel sounds normal  MS: no gross musculoskeletal defects noted, no edema  SKIN: Hair on back, no suspicious lesions or rashes  NEURO: Normal strength and tone, mentation intact and speech normal  PSYCH: mentation appears normal, affect normal/bright    Diagnostic Test Results:  none     ASSESSMENT/PLAN:   3. Healthcare maintenance  - Lipid panel reflex to direct LDL Fasting; Future  -BASIC METABOLIC PANEL (ELECTROLYTES AND KIDNEY FUNCTION)    1. Benign essential hypertension  Well controlled. Because we are adding spironolactone for hirsutism will reduce lisinopril and monitor labs/BP  - Basic metabolic panel; Future  - Albumin Random Urine Quantitative with Creat Ratio; Future  - lisinopril (PRINIVIL/ZESTRIL) 20 MG tablet; Take 0.5 tablets (10 mg) by mouth daily  Dispense: 90 tablet; Refill: 3    2. Encounter for contraceptive management, unspecified type  No contraindications. Having some intermenstrual spotting. Switching to low androgen pill to try to reduce hirsutism  - drospirenone-ethinyl estradiol (NOLVIA) 3-0.02 MG tablet; Take 1 tablet by mouth daily  Dispense: 84 tablet; Refill: 4    4. PCOS (polycystic ovarian syndrome)  No improvement in hirsutism when she lost over 40 lbs.   -Switch to low androgen OCP  - spironolactone (ALDACTONE) 50 MG tablet; Take 1 tablet (50 mg) by mouth 2 times daily  Dispense: 180 tablet; Refill: 3  -Will lower lisinopril because we are adding spironolactone  -Will monitor BP/labs    5. Encounter for screening for HIV  - HIV Antigen Antibody Combo    6. Morbid obesity (H)  Not interested in weight loss  "[place] at this time. Has been successful in the past with weight loss.       COUNSELING:  Reviewed preventive health counseling, as reflected in patient instructions    BP Readings from Last 1 Encounters:   01/18/19 116/68     Estimated body mass index is 38.28 kg/m  as calculated from the following:    Height as of this encounter: 1.702 m (5' 7\").    Weight as of this encounter: 110.9 kg (244 lb 6.4 oz).      Weight management plan: Discussed healthy diet and exercise guidelines     reports that  has never smoked. she has never used smokeless tobacco.      Counseling Resources:  ATP IV Guidelines  Pooled Cohorts Equation Calculator  Breast Cancer Risk Calculator  FRAX Risk Assessment  ICSI Preventive Guidelines  Dietary Guidelines for Americans, 2010  USDA's MyPlate  ASA Prophylaxis  Lung CA Screening    MONA Wei [facility] [place]  "

## 2019-01-19 ASSESSMENT — PATIENT HEALTH QUESTIONNAIRE - PHQ9: SUM OF ALL RESPONSES TO PHQ QUESTIONS 1-9: 7

## 2019-02-04 ENCOUNTER — E-VISIT (OUTPATIENT)
Dept: PEDIATRICS | Facility: CLINIC | Age: 34
End: 2019-02-04
Payer: COMMERCIAL

## 2019-02-04 DIAGNOSIS — B96.89 BACTERIAL VAGINOSIS: ICD-10-CM

## 2019-02-04 DIAGNOSIS — N89.8 VAGINAL ITCHING: Primary | ICD-10-CM

## 2019-02-04 DIAGNOSIS — N76.0 BACTERIAL VAGINOSIS: ICD-10-CM

## 2019-02-04 PROCEDURE — 99444 ZZC PHYSICIAN ONLINE EVALUATION & MANAGEMENT SERVICE: CPT | Performed by: NURSE PRACTITIONER

## 2019-02-05 DIAGNOSIS — N89.8 ITCHING OF VAGINA: Primary | ICD-10-CM

## 2019-02-05 LAB
SPECIMEN SOURCE: ABNORMAL
WET PREP SPEC: ABNORMAL

## 2019-02-05 PROCEDURE — 87210 SMEAR WET MOUNT SALINE/INK: CPT | Performed by: NURSE PRACTITIONER

## 2019-02-05 NOTE — TELEPHONE ENCOUNTER
(N89.8) Vaginal itching  (primary encounter diagnosis)  Comment: Per patient, low STI risk. Wet prep + for clue cells  Plan: Wet prep  -Metronidazole. No ETOH  -Discussed supportive cares and reasons to return

## 2019-02-06 RX ORDER — METRONIDAZOLE 500 MG/1
500 TABLET ORAL 2 TIMES DAILY
Qty: 14 TABLET | Refills: 0 | Status: SHIPPED | OUTPATIENT
Start: 2019-02-06 | End: 2019-03-26

## 2019-03-26 ENCOUNTER — OFFICE VISIT (OUTPATIENT)
Dept: PEDIATRICS | Facility: CLINIC | Age: 34
End: 2019-03-26
Payer: COMMERCIAL

## 2019-03-26 VITALS
HEIGHT: 67 IN | TEMPERATURE: 97.9 F | DIASTOLIC BLOOD PRESSURE: 64 MMHG | WEIGHT: 237.4 LBS | HEART RATE: 88 BPM | BODY MASS INDEX: 37.26 KG/M2 | SYSTOLIC BLOOD PRESSURE: 118 MMHG

## 2019-03-26 DIAGNOSIS — F41.9 ANXIETY AND DEPRESSION: Primary | ICD-10-CM

## 2019-03-26 DIAGNOSIS — F32.A ANXIETY AND DEPRESSION: Primary | ICD-10-CM

## 2019-03-26 PROCEDURE — 99214 OFFICE O/P EST MOD 30 MIN: CPT | Performed by: NURSE PRACTITIONER

## 2019-03-26 RX ORDER — BUPROPION HYDROCHLORIDE 300 MG/1
300 TABLET ORAL EVERY MORNING
Qty: 90 TABLET | Refills: 0 | Status: SHIPPED | OUTPATIENT
Start: 2019-03-26 | End: 2019-06-18

## 2019-03-26 ASSESSMENT — ANXIETY QUESTIONNAIRES
5. BEING SO RESTLESS THAT IT IS HARD TO SIT STILL: SEVERAL DAYS
1. FEELING NERVOUS, ANXIOUS, OR ON EDGE: NEARLY EVERY DAY
7. FEELING AFRAID AS IF SOMETHING AWFUL MIGHT HAPPEN: NOT AT ALL
3. WORRYING TOO MUCH ABOUT DIFFERENT THINGS: NEARLY EVERY DAY
IF YOU CHECKED OFF ANY PROBLEMS ON THIS QUESTIONNAIRE, HOW DIFFICULT HAVE THESE PROBLEMS MADE IT FOR YOU TO DO YOUR WORK, TAKE CARE OF THINGS AT HOME, OR GET ALONG WITH OTHER PEOPLE: EXTREMELY DIFFICULT
6. BECOMING EASILY ANNOYED OR IRRITABLE: SEVERAL DAYS
GAD7 TOTAL SCORE: 12
2. NOT BEING ABLE TO STOP OR CONTROL WORRYING: NEARLY EVERY DAY

## 2019-03-26 ASSESSMENT — MIFFLIN-ST. JEOR: SCORE: 1819.47

## 2019-03-26 ASSESSMENT — PATIENT HEALTH QUESTIONNAIRE - PHQ9
SUM OF ALL RESPONSES TO PHQ QUESTIONS 1-9: 17
5. POOR APPETITE OR OVEREATING: SEVERAL DAYS

## 2019-03-26 NOTE — PROGRESS NOTES
"  SUBJECTIVE:   Radha Rodriguez is a 33 year old female who presents to clinic today for the following health issues:    Abnormal Mood Symptoms      Duration: since 2018    Description:  Depression: YES  Anxiety: YES  Panic attacks: YES- last night     Accompanying signs and symptoms: see PHQ-9 and JULIÁN scores    History (similar episodes/previous evaluation): was on medication as teenager    Precipitating or alleviating factors: dog , new job, friend changes    Therapies tried and outcome: Zoloft (Sertraline) in past - states she felt numb on this med    Major depressive episode in HS, treated with Zoloft. Was less severe than this episode.  Fine until this fall, when started getting depression sx. Then lost her dog. Feels lonely, hopeless, anhedonic, lack of social support. SI is passive. States she would never do this. Hasn't been missing work. Fine at work but deep depression at home.  Hard to find a therapist when working 8-430    No thyroid sx.     ROS: const/psych otherwise negative     OBJECTIVE:  /64 (BP Location: Right arm, Cuff Size: Adult Large)   Pulse 88   Temp 97.9  F (36.6  C) (Tympanic)   Ht 1.702 m (5' 7\")   Wt 107.7 kg (237 lb 6.4 oz)   BMI 37.18 kg/m    CONSTITUTIONAL: Alert, well-nourished, well-groomed, NAD  RESP: Lungs CTA. No wheeze, rhonchi, rales.  CV: HRRR S1 S2 No MRG. No peripheral edema  PSYCH: Bright affect. Appropriate mentation and speech.       ASSESSMENT/PLAN:  (F41.9,  F32.9) Anxiety and depression  (primary encounter diagnosis)  Comment: Depression seems to be flaring significantly. SI is passive but present more days than not. Social support is fair. Dog recently .   Plan: buPROPion (WELLBUTRIN XL) 300 MG 24 hr tablet,         MENTAL HEALTH REFERRAL  - Adult; Outpatient         Treatment; Individual/Couples/Family/Group         Therapy/Health Psychology; Other: Behavioral         Healthcare Providers [phone]; We will         contact you to " schedule the appointment or         please call with any questi...          -Contracted for safety. Crisis plan discussed  -Jameson Borden [employer] videos on grief. He might have one on pet grief.  -[place] [facility] will call you. Rina Preciado or any other therapist.  -Start Wellbutrin one tab in the morning. Reviewed r/b/se  -3 x per week out of the house  -F/U 1 week via [employer] [facility]ers-ELIZABETH.

## 2019-03-26 NOTE — PATIENT INSTRUCTIONS
Jameson Borden youtube videos on grief. He might have one on pet grief.    [place] [facility] will call you. Rina Preciado or any other therapist.    Start Wellbutrin one tab in the morning.    3 x per week out of the house

## 2019-03-27 ASSESSMENT — ANXIETY QUESTIONNAIRES: GAD7 TOTAL SCORE: 12

## 2019-04-08 ENCOUNTER — MYC MEDICAL ADVICE (OUTPATIENT)
Dept: PEDIATRICS | Facility: CLINIC | Age: 34
End: 2019-04-08

## 2019-05-09 ENCOUNTER — THERAPY VISIT (OUTPATIENT)
Dept: CHIROPRACTIC MEDICINE | Facility: CLINIC | Age: 34
End: 2019-05-09
Payer: COMMERCIAL

## 2019-05-09 DIAGNOSIS — M54.6 PAIN IN THORACIC SPINE: ICD-10-CM

## 2019-05-09 DIAGNOSIS — M99.02 SEGMENTAL DYSFUNCTION OF THORACIC REGION: Primary | ICD-10-CM

## 2019-05-09 DIAGNOSIS — M99.08 SEGMENTAL DYSFUNCTION OF RIB CAGE: ICD-10-CM

## 2019-05-09 PROCEDURE — 98940 CHIROPRACT MANJ 1-2 REGIONS: CPT | Mod: AT | Performed by: CHIROPRACTOR

## 2019-05-09 NOTE — PROGRESS NOTES
Visit #:  3 of 8 based on treatment plan 9/13/2018    Subjective:  Radha Rodriguez is a 32 year old female who is seen in f/u up for:        Segmental dysfunction of thoracic region  Pain in thoracic spine  Segmental dysfunction of rib cage.     Since last visit on 9/27/2018,  Radha Rodriguez reports the following changes: Patient presents and states that she is doing pretty good but she is standing less and getting up less at her new job. Her pain is rated 5/10, and is in her knot in her right hsoulder blade and middle throacics,          Objective:  The following was observed:    P: palpatory tenderness    A: static palpation demonstrates intersegmental asymmetry     R: motion palpation notes restricted motion    T: localized muscle spasm at: Rhomboids, T-spine paraspinal and Traps R>>L      Assessment:    Segmental spinal dysfunction/restrictions found at:  T1 RR, LRR  T3 LR, RRR  T7 E, FR  T12 E, FR    Diagnoses:      1. Segmental dysfunction of thoracic region    2. Pain in thoracic spine    3. Segmental dysfunction of rib cage        Patient's condition:  Patient had restrictions pre-manipulation    Treatment effectiveness:  Post manipulation there is better intersegmental movement and Patient claims to feel looser post manipulation      Procedures:  CMT:  83844 Chiropractic manipulative treatment 1-2 regions performed   Thoracic: Diversified, T1, T3, T7, T12, Prone    Modalities:  59544: MSTM:  To Rhomboids, T-spine paraspinal and Traps  for 5 min    Therapeutic procedures:  Gave patient Ice instructions post adjustment, and instructions for acute care      Prognosis: Excellent    Progress towards Goals: Patient is making progress towards the goal of:  Decrease pain in right scapular region.  Avoid exacerbations of rib dysfunction.      Response to Treatment:   Reduction of symptoms with care. Pain has increased with change of jobs.       Recommendations:    Instructions:ice 20 minutes every other hour as needed  and stretch as instructed at visit    Follow-up:  Continue treatment PRN.

## 2019-06-18 DIAGNOSIS — F41.9 ANXIETY AND DEPRESSION: ICD-10-CM

## 2019-06-18 DIAGNOSIS — F32.A ANXIETY AND DEPRESSION: ICD-10-CM

## 2019-06-18 RX ORDER — BUPROPION HYDROCHLORIDE 300 MG/1
300 TABLET ORAL EVERY MORNING
Qty: 90 TABLET | Refills: 0 | Status: SHIPPED | OUTPATIENT
Start: 2019-06-18 | End: 2019-07-08

## 2019-06-18 NOTE — TELEPHONE ENCOUNTER
"Routing refill request to provider for review/approval because:  PHQ-9 score    PHQ-9 SCORE 1/18/2019 3/26/2019   PHQ-9 Total Score MyChart 7 (Mild depression) -   PHQ-9 Total Score 7 17     Requested Prescriptions   Pending Prescriptions Disp Refills     buPROPion (WELLBUTRIN XL) 300 MG 24 hr tablet [Pharmacy Med Name: BUPROPION HCL  MG TABLET]  Last Written Prescription Date:  3/26/19  Last Fill Quantity: 90,  # refills: 0   Last office visit: 3/26/2019 with prescribing provider:     Future Office Visit:   90 tablet 0     Sig: TAKE 1 TABLET (300 MG) BY MOUTH EVERY MORNING       SSRIs Protocol Passed - 6/18/2019  7:29 AM        Passed - Recent (12 mo) or future (30 days) visit within the authorizing provider's specialty     Patient had office visit in the last 12 months or has a visit in the next 30 days with authorizing provider or within the authorizing provider's specialty.  See \"Patient Info\" tab in inbasket, or \"Choose Columns\" in Meds & Orders section of the refill encounter.              Passed - Medication is Bupropion     If the medication is Bupropion (Wellbutrin), and the patient is taking for smoking cessation; OK to refill.          Passed - Medication is active on med list        Passed - Patient is age 18 or older        Passed - No active pregnancy on record        Passed - No positive pregnancy test in last 12 months        Ankita Yang RN - BC      "

## 2019-06-18 NOTE — TELEPHONE ENCOUNTER
"Requested Prescriptions   Pending Prescriptions Disp Refills     buPROPion (WELLBUTRIN XL) 300 MG 24 hr tablet [Pharmacy Med Name: BUPROPION HCL  MG TABLET] 90 tablet 0     Sig: TAKE 1 TABLET (300 MG) BY MOUTH EVERY MORNING  Last Written Prescription Date:  3/26/19  Last Fill Quantity: 90,  # refills: 0   Last office visit: 3/26/2019 with prescribing provider:  Diomedes   Future Office Visit:           SSRIs Protocol Passed - 6/18/2019  1:29 AM        Passed - Recent (12 mo) or future (30 days) visit within the authorizing provider's specialty     Patient had office visit in the last 12 months or has a visit in the next 30 days with authorizing provider or within the authorizing provider's specialty.  See \"Patient Info\" tab in inbasket, or \"Choose Columns\" in Meds & Orders section of the refill encounter.              Passed - Medication is Bupropion     If the medication is Bupropion (Wellbutrin), and the patient is taking for smoking cessation; OK to refill.          Passed - Medication is active on med list        Passed - Patient is age 18 or older        Passed - No active pregnancy on record        Passed - No positive pregnancy test in last 12 months          "

## 2019-06-18 NOTE — TELEPHONE ENCOUNTER
Short term refill given.   Please ask her to schedule phone visit or OV, or e-visit.  Please assess fur suicidality.

## 2019-06-19 NOTE — TELEPHONE ENCOUNTER
Called patient and she is going to initiate an e-visit within the next couple of days. Going to postpone this for a week to check on it.     Shira Valentine CMA on 4/23/2019 at 9:32 AM

## 2019-06-24 ENCOUNTER — E-VISIT (OUTPATIENT)
Dept: PEDIATRICS | Facility: CLINIC | Age: 34
End: 2019-06-24
Payer: COMMERCIAL

## 2019-06-24 DIAGNOSIS — F32.A ANXIETY AND DEPRESSION: ICD-10-CM

## 2019-06-24 DIAGNOSIS — F41.9 ANXIETY AND DEPRESSION: ICD-10-CM

## 2019-06-24 PROCEDURE — 99444 ZZC PHYSICIAN ONLINE EVALUATION & MANAGEMENT SERVICE: CPT | Performed by: NURSE PRACTITIONER

## 2019-06-24 ASSESSMENT — ANXIETY QUESTIONNAIRES
GAD7 TOTAL SCORE: 8
4. TROUBLE RELAXING: NOT AT ALL
1. FEELING NERVOUS, ANXIOUS, OR ON EDGE: MORE THAN HALF THE DAYS
7. FEELING AFRAID AS IF SOMETHING AWFUL MIGHT HAPPEN: NOT AT ALL
3. WORRYING TOO MUCH ABOUT DIFFERENT THINGS: MORE THAN HALF THE DAYS
2. NOT BEING ABLE TO STOP OR CONTROL WORRYING: NEARLY EVERY DAY
7. FEELING AFRAID AS IF SOMETHING AWFUL MIGHT HAPPEN: NOT AT ALL
5. BEING SO RESTLESS THAT IT IS HARD TO SIT STILL: NOT AT ALL
GAD7 TOTAL SCORE: 8
GAD7 TOTAL SCORE: 8
6. BECOMING EASILY ANNOYED OR IRRITABLE: SEVERAL DAYS

## 2019-06-24 ASSESSMENT — PATIENT HEALTH QUESTIONNAIRE - PHQ9
10. IF YOU CHECKED OFF ANY PROBLEMS, HOW DIFFICULT HAVE THESE PROBLEMS MADE IT FOR YOU TO DO YOUR WORK, TAKE CARE OF THINGS AT HOME, OR GET ALONG WITH OTHER PEOPLE: SOMEWHAT DIFFICULT
SUM OF ALL RESPONSES TO PHQ QUESTIONS 1-9: 4
SUM OF ALL RESPONSES TO PHQ QUESTIONS 1-9: 4

## 2019-06-25 ASSESSMENT — ANXIETY QUESTIONNAIRES: GAD7 TOTAL SCORE: 8

## 2019-06-25 ASSESSMENT — PATIENT HEALTH QUESTIONNAIRE - PHQ9: SUM OF ALL RESPONSES TO PHQ QUESTIONS 1-9: 4

## 2019-07-08 RX ORDER — BUPROPION HYDROCHLORIDE 300 MG/1
300 TABLET ORAL EVERY MORNING
Qty: 90 TABLET | Refills: 1 | Status: SHIPPED | OUTPATIENT
Start: 2019-07-08

## 2019-07-08 NOTE — TELEPHONE ENCOUNTER
(F41.9,  F32.9) Anxiety and depression  Comment: Improved control but still symptomatic. No SI/HI. Has been talking more. Didn't tolerate SSRIs in the past.   Plan: buPROPion (WELLBUTRIN XL) 300 MG 24 hr tablet  -Continue current meds.  -F/U 6 months  -Recommended therapy

## 2019-12-09 ENCOUNTER — TELEPHONE (OUTPATIENT)
Dept: PEDIATRICS | Facility: CLINIC | Age: 34
End: 2019-12-09

## 2019-12-09 NOTE — TELEPHONE ENCOUNTER
Panel Management Review      Patient has the following on her problem list:   Depression / Dysthymia review    Measure:  Needs PHQ-9 score of 4 or less during index window.  Administer PHQ-9 and if score is 5 or more, send encounter to provider for next steps.    5 - 7 month window range:     PHQ-9 SCORE 3/26/2019 6/24/2019 6/24/2019   PHQ-9 Total Score MyChart - 4 (Minimal depression) 4 (Minimal depression)   PHQ-9 Total Score 17 4 4       If PHQ-9 recheck is 5 or more, route to provider for next steps.    Patient is due for:  PHQ9 and DAP      Composite cancer screening  Chart review shows that this patient is due/due soon for the following None  Summary:    Patient is due/failing the following:   DAP, FOLLOW UP and PHQ9    Action needed:   Patient needs to do PHQ9.    Type of outreach:    Sent Offerbox[employer] message.    Questions for provider review:    None                                                                                                                      Taylor Wood CMA    Chart routed to  Care Team.

## 2019-12-09 NOTE — LETTER
December 13, 2019      Radha Rodriguez  [address]  [place] [address]        Dear Radha,       We care about your health and have reviewed your health plan including your medical conditions, medications, and lab results.  Based on this review, it is recommended that you follow up regarding the following health topic(s):  -Depression    We recommend you take the following action(s):  -schedule a FOLLOWUP APPOINTMENT.  -Complete and return the attached PHQ-9 Form.  If your total score is greater than 9, please schedule a followup appointment.  If you answer Yes to question 9, call your clinic between the hours of 8 to 5.  You may also call the Suicide Hotline at 7-147-784-SHRA (7568) any time.     Please call us at the [facility] - [phone] (or use [employer]) to address the above recommendations.     Thank you for trusting [facility] and we appreciate the opportunity to serve you.  We look forward to supporting your healthcare needs in the future.    Healthy Regards,    Your Health Care Team  [facility]

## 2019-12-20 DIAGNOSIS — I10 BENIGN ESSENTIAL HYPERTENSION: ICD-10-CM

## 2019-12-20 RX ORDER — LISINOPRIL 20 MG/1
TABLET ORAL
Qty: 30 TABLET | Refills: 2 | Status: SHIPPED | OUTPATIENT
Start: 2019-12-20 | End: 2020-07-15

## 2019-12-20 NOTE — TELEPHONE ENCOUNTER
Called and left VM for patient to contact clinic.  Patient needs OV for depression f/u, PHQ9 & DAP.  Taylor Wood, CMA

## 2019-12-20 NOTE — TELEPHONE ENCOUNTER
"Requested Prescriptions   Pending Prescriptions Disp Refills     lisinopril (PRINIVIL/ZESTRIL) 20 MG tablet [Pharmacy Med Name: LISINOPRIL 20 MG TABLET] 30 tablet 8     Sig: TAKE 1 TABLET BY MOUTH EVERY DAY   Last Written Prescription Date:  1/18/19  Last Fill Quantity: 90,  # refills: 3   Last office visit: 3/26/2019 with prescribing provider:  Diomedes   Future Office Visit:        ACE Inhibitors (Including Combos) Protocol Failed - 12/20/2019  1:43 AM        Failed - Normal serum creatinine on file in past 12 months     Recent Labs   Lab Test 04/30/18  1522   CR 0.73             Failed - Normal serum potassium on file in past 12 months     Recent Labs   Lab Test 04/30/18  1522   POTASSIUM 4.0             Passed - Blood pressure under 140/90 in past 12 months     BP Readings from Last 3 Encounters:   03/26/19 118/64   01/18/19 116/68   12/08/18 132/84                 Passed - Recent (12 mo) or future (30 days) visit within the authorizing provider's specialty     Patient has had an office visit with the authorizing provider or a provider within the authorizing providers department within the previous 12 mos or has a future within next 30 days. See \"Patient Info\" tab in inbasket, or \"Choose Columns\" in Meds & Orders section of the refill encounter.              Passed - Medication is active on med list        Passed - Patient is age 18 or older        Passed - No active pregnancy on record        Passed - No positive pregnancy test within past 12 months          "

## 2019-12-20 NOTE — TELEPHONE ENCOUNTER
I called and spoke to the pt and she has gotten new insurance and can no longer go to [facility]. She is seeing a new pcp and she will follow up with her new pcp.   Saray Suarez on 12/20/2019 at 9:45 AM

## 2019-12-20 NOTE — TELEPHONE ENCOUNTER
Patient due for fasting office visit- 30 days supply given.  Routing to team to schedule appointment     Devi HELLER RN  [facility]  [phone]

## 2020-01-18 DIAGNOSIS — E28.2 PCOS (POLYCYSTIC OVARIAN SYNDROME): ICD-10-CM

## 2020-01-20 RX ORDER — SPIRONOLACTONE 50 MG/1
50 TABLET, FILM COATED ORAL 2 TIMES DAILY
Qty: 60 TABLET | Refills: 11 | OUTPATIENT
Start: 2020-01-20 | End: 2020-04-19

## 2020-01-20 NOTE — TELEPHONE ENCOUNTER
"Requested Prescriptions   Pending Prescriptions Disp Refills     spironolactone (ALDACTONE) 50 MG tablet [Pharmacy Med Name:  Last Written Prescription Date:  1-  Last Fill Quantity: 180 tablet,  # refills: 3   Last office visit: 3/26/2019 with prescribing provider:     Future Office Visit:     SPIRONOLACTONE 50 MG TABLET] 60 tablet 11     Sig: TAKE 1 TABLET (50 MG) BY MOUTH 2 TIMES DAILY       Diuretics (Including Combos) Protocol Failed - 1/18/2020 12:39 AM        Failed - Normal serum creatinine on file in past 12 months     Recent Labs   Lab Test 04/30/18  1522   CR 0.73              Failed - Normal serum potassium on file in past 12 months     Recent Labs   Lab Test 04/30/18  1522   POTASSIUM 4.0                    Failed - Normal serum sodium on file in past 12 months     Recent Labs   Lab Test 04/30/18  1522                 Passed - Blood pressure under 140/90 in past 12 months     BP Readings from Last 3 Encounters:   03/26/19 118/64   01/18/19 116/68   12/08/18 132/84                 Passed - Recent (12 mo) or future (30 days) visit within the authorizing provider's specialty     Patient has had an office visit with the authorizing provider or a provider within the authorizing providers department within the previous 12 mos or has a future within next 30 days. See \"Patient Info\" tab in inbasket, or \"Choose Columns\" in Meds & Orders section of the refill encounter.              Passed - Medication is active on med list        Passed - Patient is age 18 or older        Passed - No active pregancy on record        Passed - No positive pregnancy test in past 12 months          "

## 2020-03-10 ENCOUNTER — HEALTH MAINTENANCE LETTER (OUTPATIENT)
Age: 35
End: 2020-03-10

## 2020-04-01 DIAGNOSIS — I10 BENIGN ESSENTIAL HYPERTENSION: ICD-10-CM

## 2020-04-01 RX ORDER — LISINOPRIL 20 MG/1
TABLET ORAL
Qty: 30 TABLET | Refills: 2 | OUTPATIENT
Start: 2020-04-01

## 2020-04-02 DIAGNOSIS — I10 BENIGN ESSENTIAL HYPERTENSION: ICD-10-CM

## 2020-04-02 RX ORDER — LISINOPRIL 20 MG/1
TABLET ORAL
Qty: 30 TABLET | Refills: 2 | OUTPATIENT
Start: 2020-04-02

## 2020-04-02 NOTE — TELEPHONE ENCOUNTER
"See refill encounter from 12/20/19. Pt seeing new PCP.      - Rudi \"Christo\" CAITIE Duron - Patient Advocate Liason (PAL)  [facility]    "

## 2020-04-02 NOTE — TELEPHONE ENCOUNTER
"Requested Prescriptions   Pending Prescriptions Disp Refills     lisinopril (ZESTRIL) 20 MG tablet [Pharmacy Med Name: LISINOPRIL 20 MG TABLET] 30 tablet 2     Sig: TAKE 1 TABLET BY MOUTH EVERY DAY   Last Written Prescription Date:  12/20/19  Last Fill Quantity: 30,  # refills: 2   Last office visit: 3/26/2019 with prescribing provider:     Future Office Visit:        ACE Inhibitors (Including Combos) Protocol Failed - 4/2/2020  2:20 PM        Failed - Blood pressure under 140/90 in past 12 months     BP Readings from Last 3 Encounters:   03/26/19 118/64   01/18/19 116/68   12/08/18 132/84                 Failed - Recent (12 mo) or future (30 days) visit within the authorizing provider's specialty     Patient has had an office visit with the authorizing provider or a provider within the authorizing providers department within the previous 12 mos or has a future within next 30 days. See \"Patient Info\" tab in inbasket, or \"Choose Columns\" in Meds & Orders section of the refill encounter.              Failed - Normal serum creatinine on file in past 12 months     Recent Labs   Lab Test 04/30/18  1522   CR 0.73       Ok to refill medication if creatinine is low          Failed - Normal serum potassium on file in past 12 months     Recent Labs   Lab Test 04/30/18  1522   POTASSIUM 4.0             Passed - Medication is active on med list        Passed - Patient is age 18 or older        Passed - No active pregnancy on record        Passed - No positive pregnancy test within past 12 months             "

## 2020-04-06 ENCOUNTER — TELEPHONE (OUTPATIENT)
Dept: PEDIATRICS | Facility: CLINIC | Age: 35
End: 2020-04-06

## 2020-04-06 NOTE — TELEPHONE ENCOUNTER
Panel Management Review      Patient has the following on her problem list:     Depression / Dysthymia review    Measure:  Needs PHQ-9 score of 4 or less during index window.  Administer PHQ-9 and if score is 5 or more, send encounter to provider for next steps.    5 - 7 month window range: last visit e-visit 6/24/19, last OV 3/26/19    PHQ-9 SCORE 3/26/2019 6/24/2019 6/24/2019   PHQ-9 Total Score MyChart - 4 (Minimal depression) 4 (Minimal depression)   PHQ-9 Total Score 17 4 4       If PHQ-9 recheck is 5 or more, route to provider for next steps.    Patient is due for:  PHQ9 and DAP      Composite cancer screening  Chart review shows that this patient is due/due soon for the following None  Summary:    Patient is due/failing the following:   DAP, PHQ9 and PHYSICAL    Action needed:   Patient needs office visit for physical. and Patient needs to do PHQ9.    Type of outreach:    Sent [employer] message.    Questions for provider review:    None                                                 Taylor Wood CMA    Chart routed to Care Team .

## 2020-04-06 NOTE — LETTER
April 10, 2020      Radha Rodriguez  [address]  [place] [address]        Dear Radha,       We care about your health and have reviewed your health plan including your medical conditions, medications, and lab results.  Based on this review, it is recommended that you follow up regarding the following health topic(s):  -Depression  -Wellness (Physical) Visit   -Anxiety    We recommend you take the following action(s):  -schedule a FOLLOWUP APPOINTMENT.     At this time [facility] is not seeing patients in person due to COVID-19. Our providers are currently doing phone and video visits.    Please call us at the [facility] - [phone] (or use [employer]) to address the above recommendations.     Thank you for trusting [facility] and we appreciate the opportunity to serve you.  We look forward to supporting your healthcare needs in the future.    Healthy Regards,    Your Health Care Team  [facility] [place]

## 2020-04-17 NOTE — TELEPHONE ENCOUNTER
Called and left VM for patient to contact clinic.  Patient needs depression f/u, phys.  Taylor Wood, CMA

## 2020-10-08 DIAGNOSIS — I10 BENIGN ESSENTIAL HYPERTENSION: ICD-10-CM

## 2020-10-09 RX ORDER — LISINOPRIL 20 MG/1
TABLET ORAL
Qty: 90 TABLET | Refills: 0 | Status: SHIPPED | OUTPATIENT
Start: 2020-10-09 | End: 2021-01-07

## 2020-10-09 NOTE — TELEPHONE ENCOUNTER
Medication is being filled for 1 time refill only due to:  Patient needs labs creatinine and potassium. Patient needs to be seen because it has been more than one year since last visit.     Please call patient and assist in scheduling visit    Joaquin ROJAS RN, BSN

## 2020-12-27 ENCOUNTER — HEALTH MAINTENANCE LETTER (OUTPATIENT)
Age: 35
End: 2020-12-27

## 2021-01-06 DIAGNOSIS — I10 BENIGN ESSENTIAL HYPERTENSION: ICD-10-CM

## 2021-01-06 NOTE — TELEPHONE ENCOUNTER
Routing refill request to provider for review/approval because:  Johana given x1 and patient did not follow up, please advise    Maria Luisa Rodrigues, RN   [facility] -- Triage Nurse

## 2021-01-07 RX ORDER — LISINOPRIL 20 MG/1
TABLET ORAL
Qty: 30 TABLET | Refills: 0 | Status: SHIPPED | OUTPATIENT
Start: 2021-01-07 | End: 2021-02-09

## 2021-04-24 ENCOUNTER — HEALTH MAINTENANCE LETTER (OUTPATIENT)
Age: 36
End: 2021-04-24

## 2021-10-09 ENCOUNTER — HEALTH MAINTENANCE LETTER (OUTPATIENT)
Age: 36
End: 2021-10-09

## 2021-11-09 NOTE — TELEPHONE ENCOUNTER
"Requested Prescriptions   Pending Prescriptions Disp Refills     SPRINTEC 28 0.25-35 MG-MCG tablet [Pharmacy Med Name: SPRINTEC 28 DAY TABLET] 84 tablet 3     Sig: TAKE 1 TABLET BY MOUTH DAILY    Contraceptives Protocol Passed - 12/14/2018  2:08 AM       Passed - Patient is not a current smoker if age is 35 or older       Passed - Recent (12 mo) or future (30 days) visit within the authorizing provider's specialty    Patient had office visit in the last 12 months or has a visit in the next 30 days with authorizing provider or within the authorizing provider's specialty.  See \"Patient Info\" tab in inbasket, or \"Choose Columns\" in Meds & Orders section of the refill encounter.             Passed - No active pregnancy on record       Passed - No positive pregnancy test in past 12 months   Last Written Prescription Date:  1/8/2018  Last Fill Quantity: 84,  # refills: 3   Last office visit: 3/26/2018 with prescribing provider:  BILL Hercules  Future Office Visit:           "
3rd attempt. Left vm for patient to call us back.  
Ast px & pelvic exam were done on 1/8/18. Last pap on 1/8/18, wnl, not due for pap for 5 yrs. But pt need an OV for routine px, pelvic exam & breast exam.    Sent 3 months supply. Please help pt to schedule an appointment. Thanks.     Librado, RN  Triage Nurse          
Left vm for patient to call us back to schedule.  
TapnScrap message sent to patient to schedule in January.  Taylor Wood, CMA    
actual

## 2022-05-16 ENCOUNTER — HEALTH MAINTENANCE LETTER (OUTPATIENT)
Age: 37
End: 2022-05-16

## 2022-09-11 ENCOUNTER — HEALTH MAINTENANCE LETTER (OUTPATIENT)
Age: 37
End: 2022-09-11

## 2023-06-03 ENCOUNTER — HEALTH MAINTENANCE LETTER (OUTPATIENT)
Age: 38
End: 2023-06-03